# Patient Record
Sex: FEMALE | Race: WHITE | NOT HISPANIC OR LATINO | URBAN - METROPOLITAN AREA
[De-identification: names, ages, dates, MRNs, and addresses within clinical notes are randomized per-mention and may not be internally consistent; named-entity substitution may affect disease eponyms.]

---

## 2018-04-04 ENCOUNTER — APPOINTMENT (OUTPATIENT)
Dept: WOMENS IMAGING | Facility: HOSPITAL | Age: 53
End: 2018-04-04

## 2018-04-04 PROCEDURE — 77067 SCR MAMMO BI INCL CAD: CPT | Performed by: RADIOLOGY

## 2019-07-05 ENCOUNTER — APPOINTMENT (OUTPATIENT)
Dept: WOMENS IMAGING | Facility: HOSPITAL | Age: 54
End: 2019-07-05

## 2019-07-05 PROCEDURE — 77067 SCR MAMMO BI INCL CAD: CPT | Performed by: RADIOLOGY

## 2019-07-05 PROCEDURE — 77063 BREAST TOMOSYNTHESIS BI: CPT | Performed by: RADIOLOGY

## 2020-07-14 ENCOUNTER — APPOINTMENT (OUTPATIENT)
Dept: WOMENS IMAGING | Facility: HOSPITAL | Age: 55
End: 2020-07-14

## 2020-07-14 PROCEDURE — 77067 SCR MAMMO BI INCL CAD: CPT | Performed by: RADIOLOGY

## 2020-07-14 PROCEDURE — 77063 BREAST TOMOSYNTHESIS BI: CPT | Performed by: RADIOLOGY

## 2021-10-04 ENCOUNTER — APPOINTMENT (OUTPATIENT)
Dept: WOMENS IMAGING | Facility: HOSPITAL | Age: 56
End: 2021-10-04

## 2021-10-04 PROCEDURE — 77067 SCR MAMMO BI INCL CAD: CPT | Performed by: RADIOLOGY

## 2021-10-04 PROCEDURE — 77063 BREAST TOMOSYNTHESIS BI: CPT | Performed by: RADIOLOGY

## 2022-10-05 ENCOUNTER — APPOINTMENT (OUTPATIENT)
Dept: WOMENS IMAGING | Facility: HOSPITAL | Age: 57
End: 2022-10-05

## 2022-10-05 PROCEDURE — 77063 BREAST TOMOSYNTHESIS BI: CPT | Performed by: RADIOLOGY

## 2022-10-05 PROCEDURE — 77067 SCR MAMMO BI INCL CAD: CPT | Performed by: RADIOLOGY

## 2023-10-12 ENCOUNTER — OFFICE VISIT (OUTPATIENT)
Dept: INTERNAL MEDICINE | Facility: CLINIC | Age: 58
End: 2023-10-12
Payer: COMMERCIAL

## 2023-10-12 VITALS
HEIGHT: 66 IN | HEART RATE: 113 BPM | TEMPERATURE: 98.2 F | WEIGHT: 245.6 LBS | DIASTOLIC BLOOD PRESSURE: 83 MMHG | SYSTOLIC BLOOD PRESSURE: 121 MMHG | BODY MASS INDEX: 39.47 KG/M2 | OXYGEN SATURATION: 95 %

## 2023-10-12 DIAGNOSIS — F33.2 SEVERE EPISODE OF RECURRENT MAJOR DEPRESSIVE DISORDER, WITHOUT PSYCHOTIC FEATURES: ICD-10-CM

## 2023-10-12 DIAGNOSIS — F41.9 ANXIETY: ICD-10-CM

## 2023-10-12 DIAGNOSIS — M79.605 LEG PAIN, BILATERAL: ICD-10-CM

## 2023-10-12 DIAGNOSIS — M79.604 LEG PAIN, BILATERAL: ICD-10-CM

## 2023-10-12 DIAGNOSIS — B37.2 YEAST INFECTION OF THE SKIN: ICD-10-CM

## 2023-10-12 DIAGNOSIS — Z76.89 ENCOUNTER TO ESTABLISH CARE: Primary | ICD-10-CM

## 2023-10-12 RX ORDER — NYSTATIN AND TRIAMCINOLONE ACETONIDE 100000; 1 [USP'U]/G; MG/G
OINTMENT TOPICAL
COMMUNITY
Start: 2023-10-09

## 2023-10-12 RX ORDER — FLUCONAZOLE 150 MG/1
TABLET ORAL
COMMUNITY
Start: 2023-10-09

## 2023-10-12 RX ORDER — BUPROPION HYDROCHLORIDE 150 MG/1
150 TABLET ORAL DAILY
Qty: 30 TABLET | Refills: 0 | Status: SHIPPED | OUTPATIENT
Start: 2023-10-12

## 2023-10-12 NOTE — PATIENT INSTRUCTIONS
Depression Screening  Depression screening is a tool that your health care provider can use to learn if you have symptoms of depression. Depression is a common condition with many symptoms that are also often found in other conditions. Depression is treatable, but it must first be diagnosed. You may not know that certain feelings, thoughts, and behaviors that you are having can be symptoms of depression.  Taking a depression screening test can help you and your health care provider decide if you need more assessment, or if you should be referred to a mental health care provider.  What are the screening tests?  You may have a physical exam to see if another condition is affecting your mental health. You may have a blood or urine sample taken during the physical exam.  You may be interviewed or offered a written test using a screening tool that was developed from research, such as one of these:  Patient Health Questionnaire (PHQ). This is a set of either 2 or 9 questions. A health care provider who has been trained to score this screening test uses a guide to assess if your symptoms suggest that you may have depression.  Lara Depression Rating Scale (HAM-D). This is a set of either 17 or 24 questions. You may be asked to take it again during or after your treatment, to see if your depression has gotten better.  Gates Depression Inventory (BDI). This is a set of 21 multiple choice questions. Your health care provider scores your answers to assess:  Your level of depression, ranging from mild to severe.  Your response to treatment.  Your health care provider may talk with you about your daily activities, such as eating, sleeping, work, and recreation, and ask if you have had any changes in activity.  Your health care provider may ask you to see a mental health specialist, such as a psychiatrist or psychologist, for more evaluation.  Who should be screened for depression?    All adults, including adults with a family  history of a mental health disorder.  People who are 10-21 years old.  People who are recovering from an acute condition, such as myocardial infarction (MI) or stroke.  Pregnant women, or women who have given birth.  People who have a long-term (chronic) illness.  Anyone who has been diagnosed with another type of mental health disorder.  Anyone who has symptoms that could show depression.  What do my results mean?  Your health care provider will review the results of your depression screening, physical exam, and lab tests. Positive screens suggest that you may have depression. Screening is the first step in getting the care that you may need. It will be important for you to know the results of your tests.  Ask your health care provider, or the department that is doing your screening tests, when your results will be ready.  Talk with your health care provider about your results, diagnosis, and recommendations for follow-up.  A diagnosis of depression is made using information from the Diagnostic and Statistical Manual of Mental Disorders (DSM-5). This is a book that lists the number and type of symptoms that must be present for a health care provider to give a specific diagnosis.  Your health care provider may work with you to treat your symptoms of depression, or your health care provider may help you find a mental health provider who can assess and help you develop a plan to treat your depression.  Get help right away if:  You have thoughts about hurting yourself or others.  If you ever feel like you may hurt yourself or others, or have thoughts about taking your own life, get help right away. Go to your nearest emergency department or:  Call your local emergency services (815 in the [place]  Call a suicide crisis helpline, such as the National Suicide Prevention Lifeline at 1-585.989.4867 or 410 in the [place] This is open 24 hours a day in the [place]  Text the Crisis Text Line at 944909 (in the  [place]  Summary  Depression screening is the first step in getting the help that you may need.  If your screening test shows symptoms of depression (is positive), your health care provider may ask you to see a mental health provider who will help identify ways to treat your depression.  Anyone aged 10 or older should be screened for depression.  This information is not intended to replace advice given to you by your health care provider. Make sure you discuss any questions you have with your health care provider.  Document Revised: 07/13/2022 Document Reviewed: 03/28/2022  [employer] Patient Education c 2023 [employer]    Generalized Anxiety Disorder, Adult  Generalized anxiety disorder (FLACA) is a mental health condition. Unlike normal worries, anxiety related to FLACA is not triggered by a specific event. These worries do not fade or get better with time. FLACA interferes with relationships, work, and school.  FLACA symptoms can vary from mild to severe. People with severe FLACA can have intense waves of anxiety with physical symptoms that are similar to panic attacks.  What are the causes?  The exact cause of FLACA is not known, but the following are believed to have an impact:  Differences in natural brain chemicals.  Genes passed down from parents to children.  Differences in the way threats are perceived.  Development and stress during childhood.  Personality.  What increases the risk?  The following factors may make you more likely to develop this condition:  Being female.  Having a family history of anxiety disorders.  Being very shy.  Experiencing very stressful life events, such as the death of a loved one.  Having a very stressful family environment.  What are the signs or symptoms?  People with FLACA often worry excessively about many things in their lives, such as their health and family. Symptoms may also include:  Mental and emotional symptoms:  Worrying excessively about natural disasters.  Fear of being  late.  Difficulty concentrating.  Fears that others are judging your performance.  Physical symptoms:  Fatigue.  Headaches, muscle tension, muscle twitches, trembling, or feeling shaky.  Feeling like your heart is pounding or beating very fast.  Feeling out of breath or like you cannot take a deep breath.  Having trouble falling asleep or staying asleep, or experiencing restlessness.  Sweating.  Nausea, diarrhea, or irritable bowel syndrome (IBS).  Behavioral symptoms:  Experiencing erratic moods or irritability.  Avoidance of new situations.  Avoidance of people.  Extreme difficulty making decisions.  How is this diagnosed?  This condition is diagnosed based on your symptoms and medical history. You will also have a physical exam. Your health care provider may perform tests to rule out other possible causes of your symptoms.  To be diagnosed with FLACA, a person must have anxiety that:  Is out of his or her control.  Affects several different aspects of his or her life, such as work and relationships.  Causes distress that makes him or her unable to take part in normal activities.  Includes at least three symptoms of FLACA, such as restlessness, fatigue, trouble concentrating, irritability, muscle tension, or sleep problems.  Before your health care provider can confirm a diagnosis of FLACA, these symptoms must be present more days than they are not, and they must last for 6 months or longer.  How is this treated?  This condition may be treated with:  Medicine. Antidepressant medicine is usually prescribed for long-term daily control. Anti-anxiety medicines may be added in severe cases, especially when panic attacks occur.  Talk therapy (psychotherapy). Certain types of talk therapy can be helpful in treating FLACA by providing support, education, and guidance. Options include:  Cognitive behavioral therapy (CBT). People learn coping skills and self-calming techniques to ease their physical symptoms. They learn to identify  unrealistic thoughts and behaviors and to replace them with more appropriate thoughts and behaviors.  Acceptance and commitment therapy (ACT). This treatment teaches people how to be mindful as a way to cope with unwanted thoughts and feelings.  Biofeedback. This process trains you to manage your body's response (physiological response) through breathing techniques and relaxation methods. You will work with a therapist while machines are used to monitor your physical symptoms.  Stress management techniques. These include yoga, meditation, and exercise.  A mental health specialist can help determine which treatment is best for you. Some people see improvement with one type of therapy. However, other people require a combination of therapies.  Follow these instructions at home:  Lifestyle  Maintain a consistent routine and schedule.  Anticipate stressful situations. Create a plan and allow extra time to work with your plan.  Practice stress management or self-calming techniques that you have learned from your therapist or your health care provider.  Exercise regularly and spend time outdoors.  Eat a healthy diet that includes plenty of vegetables, fruits, whole grains, low-fat dairy products, and lean protein.  Do not eat a lot of foods that are high in fat, added sugar, or salt (sodium).  Drink plenty of water.  Avoid alcohol. Alcohol can increase anxiety.  Avoid caffeine and certain over-the-counter cold medicines. These may make you feel worse. Ask your pharmacist which medicines to avoid.  General instructions  Take over-the-counter and prescription medicines only as told by your health care provider.  Understand that you are likely to have setbacks. Accept this and be kind to yourself as you persist to take better care of yourself.  Anticipate stressful situations. Create a plan and allow extra time to work with your plan.  Recognize and accept your accomplishments, even if you  them as small.  Spend time  with people who care about you.  Keep all follow-up visits. This is important.  Where to find more information  National [place] of Mental Health: www.nimh.nih.gov  Substance Abuse and Mental Health Services: www.samhsa.gov  Contact a health care provider if:  Your symptoms do not get better.  Your symptoms get worse.  You have signs of depression, such as:  A persistently sad or irritable mood.  Loss of enjoyment in activities that used to bring you sumi.  Change in weight or eating.  Changes in sleeping habits.  Get help right away if:  You have thoughts about hurting yourself or others.  If you ever feel like you may hurt yourself or others, or have thoughts about taking your own life, get help right away. Go to your nearest emergency department or:  Call your local emergency services (864 in the [place]  Call a suicide crisis helpline, such as the National Suicide Prevention Lifeline at 1-600.602.5129 or 816 in the [place] This is open 24 hours a day in the [place]  Text the Crisis Text Line at 590826 (in the [place]  Summary  Generalized anxiety disorder (FLACA) is a mental health condition that involves worry that is not triggered by a specific event.  People with FLACA often worry excessively about many things in their lives, such as their health and family.  FLACA may cause symptoms such as restlessness, trouble concentrating, sleep problems, frequent sweating, nausea, diarrhea, headaches, and trembling or muscle twitching.  A mental health specialist can help determine which treatment is best for you. Some people see improvement with one type of therapy. However, other people require a combination of therapies.  This information is not intended to replace advice given to you by your health care provider. Make sure you discuss any questions you have with your health care provider.  Document Revised: 07/13/2022 Document Reviewed: 04/10/2022  [employer] Patient Education c 2023 [employer]

## 2023-10-12 NOTE — PROGRESS NOTES
Chief Complaint  Establish Care, Rash, Leg Pain (Thigh ), Depression, and Anxiety     Subjective:      History of Present Illness {CC  Problem List  Visit  Diagnosis   Encounters  Notes  Medications  Labs  Result Review Imaging  Media :23}     Carola Joiner presents to [facility] for:      History of Present Illness   Pt is new to me today and is here to establish care with a new PCP.  Pt has not had a PCP in years.     Pt states she did have a rash under her breast, thighs and under skin fold under her stomach. Pt states she had been treating with lotrimin and monistat. Pt saw her Dermatologist and was prescribed diflucan and nystatin.     Leg pain- pt states the tops of both of her thighs have been sore for the last few months. Pt states this happens daily and usually is at the end of the day. Pt sates that she occasionally takes aleve for this which helps.    Depression/anxiety- pt has never been diagnosed with depression. Pt has never been on medication for this.       I have reviewed patient's medical history, any new submitted information provided by patient or medical assistant and updated medical record.      Objective:      Physical Exam  Vitals reviewed.   Constitutional:       Appearance: Normal appearance.   HENT:      Head: Normocephalic.   Cardiovascular:      Rate and Rhythm: Normal rate and regular rhythm.      Pulses: Normal pulses.      Heart sounds: Normal heart sounds.   Pulmonary:      Effort: Pulmonary effort is normal.      Breath sounds: Normal breath sounds.   Skin:     General: Skin is warm and dry.      Capillary Refill: Capillary refill takes less than 2 seconds.   Neurological:      General: No focal deficit present.      Mental Status: She is alert.   Psychiatric:         Mood and Affect: Mood normal. Mood is anxious. Affect is tearful.         Behavior: Behavior normal.        Result Review  Data Reviewed:{ Labs  Result Review  Imaging   "Med Tab  Media :23}                Vital Signs:   /83 (BP Location: Left arm, Patient Position: Sitting, Cuff Size: Adult)   Pulse 113   Temp 98.2 øF (36.8 øC) (Oral)   Ht 167.6 cm (66\")   Wt 111 kg (245 lb 9.6 oz)   SpO2 95%   BMI 39.64 kg/mý         Requested Prescriptions     Signed Prescriptions Disp Refills    buPROPion XL (Wellbutrin XL) 150 MG 24 hr tablet 30 tablet 0     Sig: Take 1 tablet by mouth Daily.       Routine medications provided by this office will also be refilled via pharmacy request.       Current Outpatient Medications:     fluconazole (DIFLUCAN) 150 MG tablet, , Disp: , Rfl:     nystatin-triamcinolone (MYCOLOG) 286374-8.1 UNIT/GM-% ointment, , Disp: , Rfl:     buPROPion XL (Wellbutrin XL) 150 MG 24 hr tablet, Take 1 tablet by mouth Daily., Disp: 30 tablet, Rfl: 0     Assessment and Plan:      Assessment and Plan {CC Problem List  Visit Diagnosis  ROS  Review (Popup)  Health Maintenance  Quality  BestPractice  Medications  SmartSets  SnapShot Encounters  Media :23}     Problem List Items Addressed This Visit    None  Visit Diagnoses       Encounter to establish care    -  Primary    Severe episode of recurrent major depressive disorder, without psychotic features        Relevant Medications    buPROPion XL (Wellbutrin XL) 150 MG 24 hr tablet    Anxiety        Relevant Medications    buPROPion XL (Wellbutrin XL) 150 MG 24 hr tablet    Yeast infection of the skin        Relevant Medications    fluconazole (DIFLUCAN) 150 MG tablet    nystatin-triamcinolone (MYCOLOG) 370915-5.1 UNIT/GM-% ointment    Leg pain, bilateral              Educated patient on dosing and side effects of Wellbutrin.  If this does not work for patient will consider using Zoloft or additional SSRI possibly for chronic pain.  Educated patient to continue taking the nystatin and keeping skin folds dry and clean.  Educated patient to stretch, use ice or heat, rest.  We can consider meloxicam in the " future if this pain continues.  Patient verbalized understanding and is comfortable with plan of care    Follow Up {Instructions Charge Capture  Follow-up Communications :23}     Return in about 1 month (around 11/12/2023) for Annual physical.    I spent 30 minutes caring for Carola on this date of service. This time includes time spent by me in the following activities: preparing for the visit, reviewing tests, obtaining and/or reviewing a separately obtained history, performing a medically appropriate examination and/or evaluation, counseling and educating the patient/family/caregiver, ordering medications, tests, or procedures, documenting information in the medical record, independently interpreting results and communicating that information with the patient/family/caregiver, and care coordination     Patient was given instructions and counseling regarding her condition or for health maintenance advice. Please see specific information pulled into the AVS if appropriate.    Dragon disclaimer:   Much of this encounter note is an electronic transcription/translation of spoken language to printed text. The electronic translation of spoken language may permit erroneous, or at times, nonsensical words or phrases to be inadvertently transcribed; Although I have reviewed the note for such errors, some may still exist.     Additional Patient Counseling:       Patient Instructions   Depression Screening  Depression screening is a tool that your health care provider can use to learn if you have symptoms of depression. Depression is a common condition with many symptoms that are also often found in other conditions. Depression is treatable, but it must first be diagnosed. You may not know that certain feelings, thoughts, and behaviors that you are having can be symptoms of depression.  Taking a depression screening test can help you and your health care provider decide if you need more assessment, or if you should be  referred to a mental health care provider.  What are the screening tests?  You may have a physical exam to see if another condition is affecting your mental health. You may have a blood or urine sample taken during the physical exam.  You may be interviewed or offered a written test using a screening tool that was developed from research, such as one of these:  Patient Health Questionnaire (PHQ). This is a set of either 2 or 9 questions. A health care provider who has been trained to score this screening test uses a guide to assess if your symptoms suggest that you may have depression.  Lara Depression Rating Scale (HAM-D). This is a set of either 17 or 24 questions. You may be asked to take it again during or after your treatment, to see if your depression has gotten better.  Gates Depression Inventory (BDI). This is a set of 21 multiple choice questions. Your health care provider scores your answers to assess:  Your level of depression, ranging from mild to severe.  Your response to treatment.  Your health care provider may talk with you about your daily activities, such as eating, sleeping, work, and recreation, and ask if you have had any changes in activity.  Your health care provider may ask you to see a mental health specialist, such as a psychiatrist or psychologist, for more evaluation.  Who should be screened for depression?    All adults, including adults with a family history of a mental health disorder.  People who are 10-21 years old.  People who are recovering from an acute condition, such as myocardial infarction (MI) or stroke.  Pregnant women, or women who have given birth.  People who have a long-term (chronic) illness.  Anyone who has been diagnosed with another type of mental health disorder.  Anyone who has symptoms that could show depression.  What do my results mean?  Your health care provider will review the results of your depression screening, physical exam, and lab tests. Positive  screens suggest that you may have depression. Screening is the first step in getting the care that you may need. It will be important for you to know the results of your tests.  Ask your health care provider, or the department that is doing your screening tests, when your results will be ready.  Talk with your health care provider about your results, diagnosis, and recommendations for follow-up.  A diagnosis of depression is made using information from the Diagnostic and Statistical Manual of Mental Disorders (DSM-5). This is a book that lists the number and type of symptoms that must be present for a health care provider to give a specific diagnosis.  Your health care provider may work with you to treat your symptoms of depression, or your health care provider may help you find a mental health provider who can assess and help you develop a plan to treat your depression.  Get help right away if:  You have thoughts about hurting yourself or others.  If you ever feel like you may hurt yourself or others, or have thoughts about taking your own life, get help right away. Go to your nearest emergency department or:  Call your local emergency services (264 in the [place]  Call a suicide crisis helpline, such as the National Suicide Prevention Lifeline at 1-831.697.6151 or 027 in the [place] This is open 24 hours a day in the [place]  Text the Crisis Text Line at 793482 (in the [place]  Summary  Depression screening is the first step in getting the help that you may need.  If your screening test shows symptoms of depression (is positive), your health care provider may ask you to see a mental health provider who will help identify ways to treat your depression.  Anyone aged 10 or older should be screened for depression.  This information is not intended to replace advice given to you by your health care provider. Make sure you discuss any questions you have with your health care provider.  Document Revised: 07/13/2022 Document  Reviewed: 03/28/2022  [employer] Patient Education c 2023 [employer]    Generalized Anxiety Disorder, Adult  Generalized anxiety disorder (FLACA) is a mental health condition. Unlike normal worries, anxiety related to FLACA is not triggered by a specific event. These worries do not fade or get better with time. FLACA interferes with relationships, work, and school.  FLACA symptoms can vary from mild to severe. People with severe FLACA can have intense waves of anxiety with physical symptoms that are similar to panic attacks.  What are the causes?  The exact cause of FLACA is not known, but the following are believed to have an impact:  Differences in natural brain chemicals.  Genes passed down from parents to children.  Differences in the way threats are perceived.  Development and stress during childhood.  Personality.  What increases the risk?  The following factors may make you more likely to develop this condition:  Being female.  Having a family history of anxiety disorders.  Being very shy.  Experiencing very stressful life events, such as the death of a loved one.  Having a very stressful family environment.  What are the signs or symptoms?  People with FLACA often worry excessively about many things in their lives, such as their health and family. Symptoms may also include:  Mental and emotional symptoms:  Worrying excessively about natural disasters.  Fear of being late.  Difficulty concentrating.  Fears that others are judging your performance.  Physical symptoms:  Fatigue.  Headaches, muscle tension, muscle twitches, trembling, or feeling shaky.  Feeling like your heart is pounding or beating very fast.  Feeling out of breath or like you cannot take a deep breath.  Having trouble falling asleep or staying asleep, or experiencing restlessness.  Sweating.  Nausea, diarrhea, or irritable bowel syndrome (IBS).  Behavioral symptoms:  Experiencing erratic moods or irritability.  Avoidance of new situations.  Avoidance of  people.  Extreme difficulty making decisions.  How is this diagnosed?  This condition is diagnosed based on your symptoms and medical history. You will also have a physical exam. Your health care provider may perform tests to rule out other possible causes of your symptoms.  To be diagnosed with FLACA, a person must have anxiety that:  Is out of his or her control.  Affects several different aspects of his or her life, such as work and relationships.  Causes distress that makes him or her unable to take part in normal activities.  Includes at least three symptoms of FLACA, such as restlessness, fatigue, trouble concentrating, irritability, muscle tension, or sleep problems.  Before your health care provider can confirm a diagnosis of FLACA, these symptoms must be present more days than they are not, and they must last for 6 months or longer.  How is this treated?  This condition may be treated with:  Medicine. Antidepressant medicine is usually prescribed for long-term daily control. Anti-anxiety medicines may be added in severe cases, especially when panic attacks occur.  Talk therapy (psychotherapy). Certain types of talk therapy can be helpful in treating FLACA by providing support, education, and guidance. Options include:  Cognitive behavioral therapy (CBT). People learn coping skills and self-calming techniques to ease their physical symptoms. They learn to identify unrealistic thoughts and behaviors and to replace them with more appropriate thoughts and behaviors.  Acceptance and commitment therapy (ACT). This treatment teaches people how to be mindful as a way to cope with unwanted thoughts and feelings.  Biofeedback. This process trains you to manage your body's response (physiological response) through breathing techniques and relaxation methods. You will work with a therapist while machines are used to monitor your physical symptoms.  Stress management techniques. These include yoga, meditation, and exercise.  A  mental health specialist can help determine which treatment is best for you. Some people see improvement with one type of therapy. However, other people require a combination of therapies.  Follow these instructions at home:  Lifestyle  Maintain a consistent routine and schedule.  Anticipate stressful situations. Create a plan and allow extra time to work with your plan.  Practice stress management or self-calming techniques that you have learned from your therapist or your health care provider.  Exercise regularly and spend time outdoors.  Eat a healthy diet that includes plenty of vegetables, fruits, whole grains, low-fat dairy products, and lean protein.  Do not eat a lot of foods that are high in fat, added sugar, or salt (sodium).  Drink plenty of water.  Avoid alcohol. Alcohol can increase anxiety.  Avoid caffeine and certain over-the-counter cold medicines. These may make you feel worse. Ask your pharmacist which medicines to avoid.  General instructions  Take over-the-counter and prescription medicines only as told by your health care provider.  Understand that you are likely to have setbacks. Accept this and be kind to yourself as you persist to take better care of yourself.  Anticipate stressful situations. Create a plan and allow extra time to work with your plan.  Recognize and accept your accomplishments, even if you  them as small.  Spend time with people who care about you.  Keep all follow-up visits. This is important.  Where to find more information  National Worth of Mental Health: www.nimh.nih.gov  Substance Abuse and Mental Health Services: www.samhsa.gov  Contact a health care provider if:  Your symptoms do not get better.  Your symptoms get worse.  You have signs of depression, such as:  A persistently sad or irritable mood.  Loss of enjoyment in activities that used to bring you sumi.  Change in weight or eating.  Changes in sleeping habits.  Get help right away if:  You have thoughts  about hurting yourself or others.  If you ever feel like you may hurt yourself or others, or have thoughts about taking your own life, get help right away. Go to your nearest emergency department or:  Call your local emergency services (987 in the [place]  Call a suicide crisis helpline, such as the National Suicide Prevention Lifeline at 1-758.182.3094 or 453 in the [place] This is open 24 hours a day in the [place]  Text the Crisis Text Line at 268032 (in the [place]  Summary  Generalized anxiety disorder (FLACA) is a mental health condition that involves worry that is not triggered by a specific event.  People with FLACA often worry excessively about many things in their lives, such as their health and family.  FLACA may cause symptoms such as restlessness, trouble concentrating, sleep problems, frequent sweating, nausea, diarrhea, headaches, and trembling or muscle twitching.  A mental health specialist can help determine which treatment is best for you. Some people see improvement with one type of therapy. However, other people require a combination of therapies.  This information is not intended to replace advice given to you by your health care provider. Make sure you discuss any questions you have with your health care provider.  Document Revised: 07/13/2022 Document Reviewed: 04/10/2022  [employer] Patient Education c 2023 [employer]

## 2023-11-09 ENCOUNTER — TELEPHONE (OUTPATIENT)
Dept: INTERNAL MEDICINE | Facility: CLINIC | Age: 58
End: 2023-11-09

## 2023-11-09 DIAGNOSIS — Z00.00 ANNUAL PHYSICAL EXAM: Primary | ICD-10-CM

## 2023-11-09 DIAGNOSIS — Z13.0 SCREENING FOR DEFICIENCY ANEMIA: ICD-10-CM

## 2023-11-09 DIAGNOSIS — Z13.1 SCREENING FOR DIABETES MELLITUS: ICD-10-CM

## 2023-11-09 DIAGNOSIS — Z00.00 ANNUAL PHYSICAL EXAM: ICD-10-CM

## 2023-11-09 DIAGNOSIS — Z13.220 SCREENING FOR CHOLESTEROL LEVEL: ICD-10-CM

## 2023-11-09 DIAGNOSIS — Z13.29 SCREENING FOR THYROID DISORDER: ICD-10-CM

## 2023-11-09 DIAGNOSIS — Z13.228 SCREENING FOR METABOLIC DISORDER: ICD-10-CM

## 2023-11-09 NOTE — TELEPHONE ENCOUNTER
Caller: Carola Joiner    Relationship: Self    Best call back number: [phone]    What orders are you requesting (i.e. lab or imaging): LABS    In what timeframe would the patient need to come in: ASAP    Where will you receive your lab/imaging services: OFFICE    Additional notes: PATIENT HAS A PHYSICAL ON 11/13/23 AT 2:30 AND WOULD NOT BE ABLE TO FAST THAT LONG AND IS REQUESTING IF LAB ORDERS BE PUT IN SO SHE CAN DO THEM EARLIER. PATIENT REQUESTING A CALLBACK ONCE LAB ORDERS ARE IN TO GET SCHEDULED.

## 2023-11-10 LAB
ALBUMIN SERPL-MCNC: 4.3 G/DL (ref 3.5–5.2)
ALBUMIN/GLOB SERPL: 2 G/DL
ALP SERPL-CCNC: 101 U/L (ref 39–117)
ALT SERPL-CCNC: 16 U/L (ref 1–33)
AST SERPL-CCNC: 18 U/L (ref 1–32)
BASOPHILS # BLD AUTO: 0.04 10*3/MM3 (ref 0–0.2)
BASOPHILS NFR BLD AUTO: 0.4 % (ref 0–1.5)
BILIRUB SERPL-MCNC: 0.3 MG/DL (ref 0–1.2)
BUN SERPL-MCNC: 12 MG/DL (ref 6–20)
BUN/CREAT SERPL: 17.1 (ref 7–25)
CALCIUM SERPL-MCNC: 9.4 MG/DL (ref 8.6–10.5)
CHLORIDE SERPL-SCNC: 106 MMOL/L (ref 98–107)
CHOLEST SERPL-MCNC: 181 MG/DL (ref 0–200)
CO2 SERPL-SCNC: 28.7 MMOL/L (ref 22–29)
CREAT SERPL-MCNC: 0.7 MG/DL (ref 0.57–1)
EGFRCR SERPLBLD CKD-EPI 2021: 101 ML/MIN/1.73
EOSINOPHIL # BLD AUTO: 0.15 10*3/MM3 (ref 0–0.4)
EOSINOPHIL NFR BLD AUTO: 1.6 % (ref 0.3–6.2)
ERYTHROCYTE [DISTWIDTH] IN BLOOD BY AUTOMATED COUNT: 12.7 % (ref 12.3–15.4)
GLOBULIN SER CALC-MCNC: 2.1 GM/DL
GLUCOSE SERPL-MCNC: 92 MG/DL (ref 65–99)
HBA1C MFR BLD: 5.8 % (ref 4.8–5.6)
HCT VFR BLD AUTO: 43.6 % (ref 34–46.6)
HDLC SERPL-MCNC: 53 MG/DL (ref 40–60)
HGB BLD-MCNC: 14.8 G/DL (ref 12–15.9)
IMM GRANULOCYTES # BLD AUTO: 0.03 10*3/MM3 (ref 0–0.05)
IMM GRANULOCYTES NFR BLD AUTO: 0.3 % (ref 0–0.5)
LDLC SERPL CALC-MCNC: 107 MG/DL (ref 0–100)
LYMPHOCYTES # BLD AUTO: 2.43 10*3/MM3 (ref 0.7–3.1)
LYMPHOCYTES NFR BLD AUTO: 26.6 % (ref 19.6–45.3)
MCH RBC QN AUTO: 30.1 PG (ref 26.6–33)
MCHC RBC AUTO-ENTMCNC: 33.9 G/DL (ref 31.5–35.7)
MCV RBC AUTO: 88.8 FL (ref 79–97)
MONOCYTES # BLD AUTO: 0.69 10*3/MM3 (ref 0.1–0.9)
MONOCYTES NFR BLD AUTO: 7.5 % (ref 5–12)
NEUTROPHILS # BLD AUTO: 5.81 10*3/MM3 (ref 1.7–7)
NEUTROPHILS NFR BLD AUTO: 63.6 % (ref 42.7–76)
NRBC BLD AUTO-RTO: 0 /100 WBC (ref 0–0.2)
PLATELET # BLD AUTO: 353 10*3/MM3 (ref 140–450)
POTASSIUM SERPL-SCNC: 4.8 MMOL/L (ref 3.5–5.2)
PROT SERPL-MCNC: 6.4 G/DL (ref 6–8.5)
RBC # BLD AUTO: 4.91 10*6/MM3 (ref 3.77–5.28)
SODIUM SERPL-SCNC: 143 MMOL/L (ref 136–145)
TRIGL SERPL-MCNC: 116 MG/DL (ref 0–150)
TSH SERPL DL<=0.005 MIU/L-ACNC: 0.58 UIU/ML (ref 0.27–4.2)
VLDLC SERPL CALC-MCNC: 21 MG/DL (ref 5–40)
WBC # BLD AUTO: 9.15 10*3/MM3 (ref 3.4–10.8)

## 2023-11-13 ENCOUNTER — OFFICE VISIT (OUTPATIENT)
Dept: INTERNAL MEDICINE | Facility: CLINIC | Age: 58
End: 2023-11-13
Payer: COMMERCIAL

## 2023-11-13 VITALS
DIASTOLIC BLOOD PRESSURE: 94 MMHG | BODY MASS INDEX: 38.99 KG/M2 | OXYGEN SATURATION: 97 % | WEIGHT: 242.6 LBS | TEMPERATURE: 97.8 F | HEIGHT: 66 IN | SYSTOLIC BLOOD PRESSURE: 135 MMHG | HEART RATE: 104 BPM

## 2023-11-13 DIAGNOSIS — Z12.11 SCREEN FOR COLON CANCER: ICD-10-CM

## 2023-11-13 DIAGNOSIS — F33.2 SEVERE EPISODE OF RECURRENT MAJOR DEPRESSIVE DISORDER, WITHOUT PSYCHOTIC FEATURES: Chronic | ICD-10-CM

## 2023-11-13 DIAGNOSIS — Z00.00 ROUTINE GENERAL MEDICAL EXAMINATION AT A HEALTH CARE FACILITY: ICD-10-CM

## 2023-11-13 DIAGNOSIS — Z71.6 TOBACCO ABUSE COUNSELING: ICD-10-CM

## 2023-11-13 DIAGNOSIS — H53.9 CHANGES IN VISION: ICD-10-CM

## 2023-11-13 DIAGNOSIS — Z91.89 HX OF MODERATE SUN EXPOSURE: ICD-10-CM

## 2023-11-13 DIAGNOSIS — Z23 NEED FOR TDAP VACCINATION: ICD-10-CM

## 2023-11-13 DIAGNOSIS — Z23 NEED FOR PROPHYLACTIC VACCINATION AGAINST STREPTOCOCCUS PNEUMONIAE (PNEUMOCOCCUS) AND INFLUENZA: ICD-10-CM

## 2023-11-13 DIAGNOSIS — Z87.891 PERSONAL HISTORY OF TOBACCO USE, PRESENTING HAZARDS TO HEALTH: ICD-10-CM

## 2023-11-13 DIAGNOSIS — F41.9 ANXIETY: Primary | Chronic | ICD-10-CM

## 2023-11-13 DIAGNOSIS — Z12.11 ENCOUNTER FOR SCREENING FOR MALIGNANT NEOPLASM OF COLON: ICD-10-CM

## 2023-11-13 DIAGNOSIS — Z23 NEED FOR STREPTOCOCCUS PNEUMONIAE VACCINATION: ICD-10-CM

## 2023-11-13 DIAGNOSIS — Z12.2 ENCOUNTER FOR SCREENING FOR LUNG CANCER: ICD-10-CM

## 2023-11-13 RX ORDER — BUPROPION HYDROCHLORIDE 300 MG/1
300 TABLET ORAL DAILY
Qty: 90 TABLET | Refills: 3 | Status: SHIPPED | OUTPATIENT
Start: 2023-11-13

## 2023-11-13 NOTE — PROGRESS NOTES
Chief Complaint  Annual Exam, Anxiety, Depression, Follow-up, and Skin Problem     Subjective:      History of Present Illness {CC  Problem List  Visit  Diagnosis   Encounters  Notes  Medications  Labs  Result Review Imaging  Media :23}     Carola Joiner presents to [facility] for:      History of Present Illness     Anxiety/ Depression- pt was started on Wellbutrin  10/12/2023.    - pt states she is doing really well on wellbutrin.  Patient states that she is going through a really tough time with her son right now and although she is doing much better than she would have with that Wellbutrin she does feel like she could potentially go up on the dose.  Patient had to recently cancel seeing her daughter due to her son's mental health concerns.    Patient also states she has a long history of sun exposure and would like to be referred to her dermatologist.    Patient would like to be referred to an ophthalmologist for further management.    Pt states she has had change in her bowel habits. Pt states that     Carola is here for coordination of medical care, to discuss health maintenance, disease prevention as well as to followup on medical problems.     Patient Care Team:  Orly Bautista APRN as PCP - General (Nurse Practitioner)  Alesia Dykes MD as Consulting Physician (Obstetrics and Gynecology)     Activity level is rarely.   Exercises 0 per week.     Weight trend is     Health and Weight:   Weight trend is   Wt Readings from Last 4 Encounters:   11/13/23 110 kg (242 lb 9.6 oz)   10/12/23 111 kg (245 lb 9.6 oz)       Class 2 Severe Obesity (BMI >=35 and <=39.9). Obesity-related health conditions include the following: none. Obesity is newly identified. BMI is is above average; BMI management plan is completed. We discussed low calorie, low carb based diet program, portion control, and increasing exercise.      Health Maintenance Female:    GYN: Dr. Dykes    Last gynecology appointment: 10/2023  Patient's last mammogram was 10/2023  Advised routine self-breast exams monthly.  Sexually active: yes  Pap smears have been: yes    Colon cancer screen:     She has change in bowel habits.   Patient's last colonoscopy was 2020- cologuard .   She was advised to repeat in this year.    Vaccines: UTD     Last eye exam: will schedule     Advised regular sunscreen.        Her cardiovascular risks are:     [] No Known risk factors    [] Hypertension   [] Hyperlipidemia  [] Diabetes    [] Obesity  [x] Family history   [] Current or hx tobacco use  [] Sedentary lifestyle   [] Post-menopausal    I have reviewed patient's medical history, any new submitted information provided by patient or medical assistant and updated medical record.      Objective:      Physical Exam  Vitals reviewed.   Constitutional:       General: She is not in acute distress.     Appearance: Normal appearance. She is not ill-appearing, toxic-appearing or diaphoretic.   HENT:      Head: Normocephalic.      Nose: Nose normal.      Mouth/Throat:      Mouth: Mucous membranes are moist.   Eyes:      General:         Right eye: No discharge.         Left eye: No discharge.      Extraocular Movements: Extraocular movements intact.      Conjunctiva/sclera: Conjunctivae normal.      Pupils: Pupils are equal, round, and reactive to light.   Neck:      Vascular: No carotid bruit.   Cardiovascular:      Rate and Rhythm: Normal rate and regular rhythm.      Pulses: Normal pulses.      Heart sounds: Normal heart sounds. No murmur heard.     No friction rub. No gallop.   Pulmonary:      Effort: Pulmonary effort is normal. No respiratory distress.      Breath sounds: Normal breath sounds. No stridor. No wheezing, rhonchi or rales.   Chest:      Chest wall: No tenderness.   Abdominal:      General: Abdomen is flat. Bowel sounds are normal. There is no distension.      Palpations: Abdomen is soft. There is no mass.       "Tenderness: There is no abdominal tenderness. There is no guarding or rebound.      Hernia: No hernia is present.   Musculoskeletal:         General: No swelling, tenderness, deformity or signs of injury. Normal range of motion.      Cervical back: Normal range of motion. No rigidity or tenderness.      Right lower leg: No edema.      Left lower leg: No edema.   Lymphadenopathy:      Cervical: No cervical adenopathy.   Skin:     General: Skin is warm and dry.      Capillary Refill: Capillary refill takes less than 2 seconds.      Coloration: Skin is not jaundiced or pale.      Findings: No bruising, erythema, lesion or rash.   Neurological:      General: No focal deficit present.      Mental Status: She is alert and oriented to person, place, and time. Mental status is at baseline.      Motor: No weakness.      Gait: Gait normal.   Psychiatric:         Mood and Affect: Mood normal.         Behavior: Behavior normal.         Thought Content: Thought content normal.         Judgment: Judgment normal.        Result Review  Data Reviewed:{ Labs  Result Review  Imaging  Med Tab  Media :23}                Vital Signs:   /94 (BP Location: Left arm, Patient Position: Sitting, Cuff Size: Adult)   Pulse 104   Temp 97.8 °F (36.6 °C) (Oral)   Ht 167.6 cm (66\")   Wt 110 kg (242 lb 9.6 oz)   SpO2 97%   BMI 39.16 kg/m²         Requested Prescriptions     Signed Prescriptions Disp Refills    buPROPion XL (Wellbutrin XL) 300 MG 24 hr tablet 90 tablet 3     Sig: Take 1 tablet by mouth Daily.       Routine medications provided by this office will also be refilled via pharmacy request.       Current Outpatient Medications:     fluconazole (DIFLUCAN) 150 MG tablet, , Disp: , Rfl:     nystatin-triamcinolone (MYCOLOG) 438600-9.1 UNIT/GM-% ointment, , Disp: , Rfl:     buPROPion XL (Wellbutrin XL) 300 MG 24 hr tablet, Take 1 tablet by mouth Daily., Disp: 90 tablet, Rfl: 3     Assessment and Plan:      Assessment and Plan {CC " Problem List  Visit Diagnosis  ROS  Review (Popup)  [facility] Maintenance  Quality  BestPractice  Medications  SmartSets  SnapShot Encounters  Media :23}     Problem List Items Addressed This Visit       Anxiety - Primary (Chronic)    Relevant Medications    buPROPion XL (Wellbutrin XL) 300 MG 24 hr tablet    Severe episode of recurrent major depressive disorder, without psychotic features (Chronic)    Current Assessment & Plan     Patient's depression is recurrent and is mild without psychosis. Their depression is currently active and the condition is improving with treatment. This will be reassessed at the next regular appointment. F/U as described:patient depression is being managed by their pcp.         Relevant Medications    buPROPion XL (Wellbutrin XL) 300 MG 24 hr tablet     Other Visit Diagnoses       Encounter for screening for malignant neoplasm of colon        Relevant Orders    Ambulatory Referral For Screening Colonoscopy    Encounter for screening for lung cancer        Relevant Orders     CT Chest Low Dose Cancer Screening WO    Need for prophylactic vaccination against Streptococcus pneumoniae (pneumococcus) and influenza        Routine general medical examination at a health care facility        Screen for colon cancer        Relevant Orders    Ambulatory Referral For Screening Colonoscopy    Personal history of tobacco use, presenting hazards to health        Relevant Orders     CT Chest Low Dose Cancer Screening WO    Tobacco abuse counseling        Relevant Orders     CT Chest Low Dose Cancer Screening WO    Hx of moderate sun exposure        Relevant Orders    Ambulatory Referral to Dermatology (Completed)    Changes in vision        Relevant Orders    Ambulatory Referral to Ophthalmology (Completed)    Need for Tdap vaccination        Relevant Orders    Tdap Vaccine => 8yo IM (BOOSTRIX) (Completed)    Need for Streptococcus pneumoniae vaccination        Relevant Orders    Pneumococcal  Conjugate Vaccine 20-Valent (PCV20) (Completed)          Reviewed labs with patient.  Educated patient on new dosing of Wellbutrin.  Will order chest CT and colonoscopy for patient and review results as available.  We will send patient to dermatology and ophthalmology.  Educated patient to maintain healthy diet and daily exercise.  Educated patient on smoking cessation.  Patient verbalized understanding and is comfortable with the plan of care.    Follow Up {Instructions Charge Capture  Follow-up Communications :23}     Return in 6 months (on 5/13/2024) for Recheck.      Patient was given instructions and counseling regarding her condition or for health maintenance advice. Please see specific information pulled into the AVS if appropriate.    Dragon disclaimer:   Much of this encounter note is an electronic transcription/translation of spoken language to printed text. The electronic translation of spoken language may permit erroneous, or at times, nonsensical words or phrases to be inadvertently transcribed; Although I have reviewed the note for such errors, some may still exist.     Additional Patient Counseling:       Patient Instructions   Please review the decision aid used during our discussion regarding the Low dose lung cancer screening visit today.                         Low-Dose Lung Cancer CT Screening Visit    CHIEF COMPLAINT:    Shared Decision Making  I am discussing tobacco cessation today with Carola Joiner    SMOKING HISTORY:     Social History     Tobacco Use   Smoking Status Every Day    Packs/day: 1.50    Years: 25.00    Additional pack years: 0.00    Total pack years: 37.50    Types: Cigarettes   Smokeless Tobacco Never       SUBJECTIVE:     Carola Joiner is currently smoking 1 pack(s) per day with a  365 pack year history.  Reports no use of alternate forms of tobacco, electronic cigarettes, marijuana or other substances.  Based on the recommendation of the [place] Preventive Services Task  "Force, this patient is at high risk for lung cancer and a low-dose CT screening scan is recommended.     The patient has had no hemoptysis, unintentional weight loss or increasing shortness of breath. The patient is asymptomatic and has no signs or symptoms of lung cancer.     Together we discussed the potential benefits and potential harms of being screened for lung cancer including the potential for follow up diagnostic testing, risk for over diagnosis, false positive rate and radiation exposure using the [facility] Lung Cancer Screening Shared Decision-Making Tool. A copy of this decision aid resource has been provided in the after visit summary.  We also reviewed the patient's smoking history and counseled her on the importance and health benefits of stopping the use of tobacco products.      OBJECTIVE:    /94 (BP Location: Left arm, Patient Position: Sitting, Cuff Size: Adult)   Pulse 104   Temp 97.8 °F (36.6 °C) (Oral)   Ht 167.6 cm (66\")   Wt 110 kg (242 lb 9.6 oz)   SpO2 97%   BMI 39.16 kg/m²   General: no distress, alert and oriented  Chest: Lung sounds are clear to auscultation, no wheezes, rales or rhonchi, with symmetric air entry. No respiratory distress  Cardiovascular: RRR with no murmur auscultated      Smoking Cessation discussion:     We discussed that there are a number of resources and interventions to assist with smoking cessation if needed in the future.   On a scale of zero to ten, the patient rates their motivation to quit at a 6 out of 10 today.  Referral to stop smoking class has been offered. Medication options for tobacco cessation have been discussed with the patient.     Recommendations for continued lung cancer screening:      We discussed the NCCN guidelines for lung cancer screening and the patient verbalized understanding that annual screening is recommended until fifteen years beyond smoking as long as they have no other disease or comorbidity that would prevent " them from receiving cancer treatments such as surgery should a lung cancer be detected.  After review of the NCCN guidelines and recommendations for ongoing screening, the patient verbalized understanding of recommendations for follow-up.  The patient has decided to proceed with a Low Dose Lung Cancer Screening CT today.      15 minutes face-to-face spent counseling patient on the continued health benefits of having quit tobacco, maintaining smoking abstinence, smoking cessation strategies and resources, including the 1-800-Quit-Now referenced in the AVS, as well as the importance of adherence to annual lung cancer low-dose CT screening.

## 2023-11-15 PROBLEM — F41.9 ANXIETY: Status: ACTIVE | Noted: 2023-11-15

## 2023-11-15 PROBLEM — F33.2 SEVERE EPISODE OF RECURRENT MAJOR DEPRESSIVE DISORDER, WITHOUT PSYCHOTIC FEATURES: Status: ACTIVE | Noted: 2023-11-15

## 2023-11-15 PROBLEM — F41.9 ANXIETY: Chronic | Status: ACTIVE | Noted: 2023-11-15

## 2023-11-15 PROBLEM — F33.2 SEVERE EPISODE OF RECURRENT MAJOR DEPRESSIVE DISORDER, WITHOUT PSYCHOTIC FEATURES: Chronic | Status: ACTIVE | Noted: 2023-11-15

## 2023-11-15 NOTE — ASSESSMENT & PLAN NOTE
Patient's depression is recurrent and is mild without psychosis. Their depression is currently active and the condition is improving with treatment. This will be reassessed at the next regular appointment. F/U as described:patient depression is being managed by their pcp.

## 2024-03-12 ENCOUNTER — TELEPHONE (OUTPATIENT)
Dept: INTERNAL MEDICINE | Facility: CLINIC | Age: 59
End: 2024-03-12
Payer: COMMERCIAL

## 2024-05-14 ENCOUNTER — OFFICE VISIT (OUTPATIENT)
Dept: INTERNAL MEDICINE | Facility: CLINIC | Age: 59
End: 2024-05-14
Payer: COMMERCIAL

## 2024-05-14 VITALS
HEIGHT: 66 IN | WEIGHT: 248.8 LBS | SYSTOLIC BLOOD PRESSURE: 134 MMHG | HEART RATE: 102 BPM | BODY MASS INDEX: 39.98 KG/M2 | OXYGEN SATURATION: 97 % | TEMPERATURE: 98.7 F | DIASTOLIC BLOOD PRESSURE: 90 MMHG

## 2024-05-14 DIAGNOSIS — F17.219 CIGARETTE NICOTINE DEPENDENCE WITH NICOTINE-INDUCED DISORDER: Chronic | ICD-10-CM

## 2024-05-14 DIAGNOSIS — E66.01 MORBID (SEVERE) OBESITY DUE TO EXCESS CALORIES: ICD-10-CM

## 2024-05-14 DIAGNOSIS — M79.604 BILATERAL LEG PAIN: ICD-10-CM

## 2024-05-14 DIAGNOSIS — M79.605 BILATERAL LEG PAIN: ICD-10-CM

## 2024-05-14 DIAGNOSIS — F33.2 SEVERE EPISODE OF RECURRENT MAJOR DEPRESSIVE DISORDER, WITHOUT PSYCHOTIC FEATURES: ICD-10-CM

## 2024-05-14 DIAGNOSIS — I83.813 VARICOSE VEINS OF BOTH LOWER EXTREMITIES WITH PAIN: ICD-10-CM

## 2024-05-14 DIAGNOSIS — F41.9 ANXIETY: Primary | ICD-10-CM

## 2024-05-14 PROCEDURE — 99417 PROLNG OP E/M EACH 15 MIN: CPT

## 2024-05-14 PROCEDURE — 99215 OFFICE O/P EST HI 40 MIN: CPT

## 2024-05-14 RX ORDER — VARENICLINE TARTRATE 1 MG/1
1 TABLET, FILM COATED ORAL 2 TIMES DAILY
Qty: 56 TABLET | Refills: 1 | Status: SHIPPED | OUTPATIENT
Start: 2024-06-11 | End: 2024-08-06

## 2024-05-14 RX ORDER — HYDROXYZINE HYDROCHLORIDE 25 MG/1
25 TABLET, FILM COATED ORAL 3 TIMES DAILY PRN
Qty: 90 TABLET | Refills: 0 | Status: SHIPPED | OUTPATIENT
Start: 2024-05-14

## 2024-05-14 RX ORDER — KETOCONAZOLE 20 MG/G
CREAM TOPICAL
COMMUNITY
Start: 2024-04-30

## 2024-05-14 RX ORDER — VARENICLINE TARTRATE 0.5 (11)-1
KIT ORAL
Qty: 1 EACH | Refills: 0 | Status: SHIPPED | OUTPATIENT
Start: 2024-05-14 | End: 2024-06-11

## 2024-05-14 NOTE — PROGRESS NOTES
Chief Complaint  Weight Loss, Leg Problem, Anxiety, Depression, and Nicotine Dependence     Subjective:      History of Present Illness {CC  Problem List  Visit  Diagnosis   Encounters  Notes  Medications  Labs  Result Review Imaging  Media :23}     Carola Joiner presents to [facility] for:      History of Present Illness     Answers submitted by the patient for this visit:  Other (Submitted on 5/7/2024)  Please describe your symptoms.: I’m having some trouble with leg pain. I have a lot of vein breakage in both legs with increasing pain in my left leg. Feels like some veins are bulging. This started about three weeks ago. I would also like to discuss weight loss options.  Have you had these symptoms before?: No  How long have you been having these symptoms?: Greater than 2 weeks  Please describe any probable cause for these symptoms. 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 About twenty years of employment. The last five years I’ve been much more sedentary. I’m also very overweight.  Primary Reason for Visit (Submitted on 5/7/2024)  What is the primary reason for your visit?: Other    Anxiety/ Depression- pt was started on Wellbutrin  10/12/2023.               - pt states she is doing really well on wellbutrin.  Patient states that she is going through a really tough time with her son right now.      Leg pain- pt states that she has always had varicose veins, but recently this has started to worsen and cause more pain. Pt states most pain is when she is moving. Pt has tried to wear compression stockings, but states she does not wear them constantly.     Weight loss- pt has lost 100lbs twice in life. Pt is trying WW at this time. Pt states she has a portion control issue. Pt has lost 7 lbs recently, but with her mental health currently this is hard to stay motivated.     Smoking cessation- pt's  just was prescribed  "chantix and states that shew would like to quit with him.       I have reviewed patient's medical history, any new submitted information provided by patient or medical assistant and updated medical record.      Objective:      Physical Exam  Vitals reviewed.   Constitutional:       Appearance: Normal appearance. She is obese.   HENT:      Head: Normocephalic.   Cardiovascular:      Rate and Rhythm: Normal rate and regular rhythm.      Pulses: Normal pulses.      Heart sounds: Normal heart sounds.   Pulmonary:      Effort: Pulmonary effort is normal.      Breath sounds: Normal breath sounds.   Skin:     General: Skin is warm and dry.      Capillary Refill: Capillary refill takes less than 2 seconds.      Comments: Varicose veins on bilat legs   Neurological:      General: No focal deficit present.      Mental Status: She is alert.   Psychiatric:         Mood and Affect: Mood normal.         Behavior: Behavior normal.        Result Review  Data Reviewed:{ Labs  Result Review  Imaging  Med Tab  Media :23}                Vital Signs:   /90 (BP Location: Left arm, Patient Position: Sitting, Cuff Size: Large Adult)   Pulse 102   Temp 98.7 °F (37.1 °C) (Oral)   Ht 167.6 cm (66\")   Wt 113 kg (248 lb 12.8 oz)   SpO2 97%   BMI 40.16 kg/m²   Estimated body mass index is 40.16 kg/m² as calculated from the following:    Height as of this encounter: 167.6 cm (66\").    Weight as of this encounter: 113 kg (248 lb 12.8 oz).        Requested Prescriptions     Signed Prescriptions Disp Refills    hydrOXYzine (ATARAX) 25 MG tablet 90 tablet 0     Sig: Take 1 tablet by mouth 3 (Three) Times a Day As Needed for Anxiety.    Varenicline Tartrate, Starter, 0.5 MG X 11 & 1 MG X 42 tablet therapy pack 1 each 0     Sig: Take 0.5 mg by mouth Daily for 3 days, THEN 0.5 mg 2 (Two) Times a Day for 4 days, THEN 1 mg 2 (Two) Times a Day for 21 days. Take 0.5 mg po daily x 3 days, then 0.5 mg po bid x 4 days, then 1 mg po bid    " varenicline (Chantix Continuing Month Pak) 1 MG tablet 56 tablet 1     Sig: Take 1 tablet by mouth 2 (Two) Times a Day for 56 days.       Routine medications provided by this office will also be refilled via pharmacy request.       Current Outpatient Medications:     buPROPion XL (Wellbutrin XL) 300 MG 24 hr tablet, Take 1 tablet by mouth Daily., Disp: 90 tablet, Rfl: 3    fluconazole (DIFLUCAN) 150 MG tablet, , Disp: , Rfl:     ketoconazole (NIZORAL) 2 % cream, , Disp: , Rfl:     nystatin-triamcinolone (MYCOLOG) 929006-6.1 UNIT/GM-% ointment, , Disp: , Rfl:     hydrOXYzine (ATARAX) 25 MG tablet, Take 1 tablet by mouth 3 (Three) Times a Day As Needed for Anxiety., Disp: 90 tablet, Rfl: 0    [START ON 6/11/2024] varenicline (Chantix Continuing Month Pak) 1 MG tablet, Take 1 tablet by mouth 2 (Two) Times a Day for 56 days., Disp: 56 tablet, Rfl: 1    Varenicline Tartrate, Starter, 0.5 MG X 11 & 1 MG X 42 tablet therapy pack, Take 0.5 mg by mouth Daily for 3 days, THEN 0.5 mg 2 (Two) Times a Day for 4 days, THEN 1 mg 2 (Two) Times a Day for 21 days. Take 0.5 mg po daily x 3 days, then 0.5 mg po bid x 4 days, then 1 mg po bid, Disp: 1 each, Rfl: 0     Assessment and Plan:      Assessment and Plan {CC Problem List  Visit Diagnosis  ROS  Review (Popup)  [facility] Maintenance  Quality  BestPractice  Medications  SmartSets  SnapShot Encounters  Media :23}     Diagnoses and all orders for this visit:    1. Anxiety (Primary)  -     hydrOXYzine (ATARAX) 25 MG tablet; Take 1 tablet by mouth 3 (Three) Times a Day As Needed for Anxiety.  Dispense: 90 tablet; Refill: 0    2. Severe episode of recurrent major depressive disorder, without psychotic features  -     hydrOXYzine (ATARAX) 25 MG tablet; Take 1 tablet by mouth 3 (Three) Times a Day As Needed for Anxiety.  Dispense: 90 tablet; Refill: 0    3. Bilateral leg pain  -     Ambulatory Referral to Vascular Surgery    4. Varicose veins of both lower extremities with  pain  -     Ambulatory Referral to Vascular Surgery    5. Cigarette nicotine dependence with nicotine-induced disorder  -     Varenicline Tartrate, Starter, 0.5 MG X 11 & 1 MG X 42 tablet therapy pack; Take 0.5 mg by mouth Daily for 3 days, THEN 0.5 mg 2 (Two) Times a Day for 4 days, THEN 1 mg 2 (Two) Times a Day for 21 days. Take 0.5 mg po daily x 3 days, then 0.5 mg po bid x 4 days, then 1 mg po bid  Dispense: 1 each; Refill: 0  -     varenicline (Chantix Continuing Month Justin) 1 MG tablet; Take 1 tablet by mouth 2 (Two) Times a Day for 56 days.  Dispense: 56 tablet; Refill: 1    6. Morbid (severe) obesity due to excess calories             New Medications Ordered This Visit   Medications    hydrOXYzine (ATARAX) 25 MG tablet     Sig: Take 1 tablet by mouth 3 (Three) Times a Day As Needed for Anxiety.     Dispense:  90 tablet     Refill:  0    Varenicline Tartrate, Starter, 0.5 MG X 11 & 1 MG X 42 tablet therapy pack     Sig: Take 0.5 mg by mouth Daily for 3 days, THEN 0.5 mg 2 (Two) Times a Day for 4 days, THEN 1 mg 2 (Two) Times a Day for 21 days. Take 0.5 mg po daily x 3 days, then 0.5 mg po bid x 4 days, then 1 mg po bid     Dispense:  1 each     Refill:  0    varenicline (Chantix Continuing Month Pak) 1 MG tablet     Sig: Take 1 tablet by mouth 2 (Two) Times a Day for 56 days.     Dispense:  56 tablet     Refill:  1     Pt to see about weight loss medication coverage.  Educated patient on prescription Wegovy and Mounjaro and also compounding.  Explained the risks and benefits of both.  Explained side effects to patient.  Patient would like to wait to try smoking cessation first and then try weight loss medication.   Will rpescribe chantix for smoking cessation-educated patient heavily on the risks of worsening depression on Chantix.  Educated patient that she would need to monitor herself for suicide ideations.  Educated patient that if this occurs to immediately stop the medication and be seen in the ER.   Patient states her  in her room had long discussions regarding watching each other for worsening depression.  Patient is very aware of the risks of taking Chantix.  Patient would still like to try this as she really wants to quit smoking.  Will send pt to vascular surgery for bilat leg pain.  Educated pt on dosing and s/e of atarax.     I spent 64 minutes caring for Carola on this date of service. This time includes time spent by me in the following activities: preparing for the visit, reviewing tests, obtaining and/or reviewing a separately obtained history, performing a medically appropriate examination and/or evaluation, counseling and educating the patient/family/caregiver, ordering medications, tests, or procedures, referring and communicating with other health care professionals, documenting information in the medical record, independently interpreting results and communicating that information with the patient/family/caregiver, care coordination, and pt had many questons and health topics      Follow Up {Instructions Charge Capture  Follow-up Communications :23}     Return in about 3 months (around 8/14/2024) for Recheck.      Patient was given instructions and counseling regarding her condition or for health maintenance advice. Please see specific information pulled into the AVS if appropriate.    Dragon disclaimer:   Much of this encounter note is an electronic transcription/translation of spoken language to printed text. The electronic translation of spoken language may permit erroneous, or at times, nonsensical words or phrases to be inadvertently transcribed; Although I have reviewed the note for such errors, some may still exist.     Additional Patient Counseling:       There are no Patient Instructions on file for this visit.

## 2024-05-15 ENCOUNTER — OFFICE VISIT (OUTPATIENT)
Age: 59
End: 2024-05-15
Payer: COMMERCIAL

## 2024-05-15 VITALS
HEIGHT: 66 IN | BODY MASS INDEX: 39.86 KG/M2 | SYSTOLIC BLOOD PRESSURE: 124 MMHG | WEIGHT: 248 LBS | DIASTOLIC BLOOD PRESSURE: 80 MMHG

## 2024-05-15 DIAGNOSIS — I87.302 VENOUS HYPERTENSION OF LEFT LOWER EXTREMITY: Primary | ICD-10-CM

## 2024-05-15 NOTE — PROGRESS NOTES
"Chief Complaint  New Patient (Bilateral varicose vein, left leg worse. )    Subjective      History of Present Illness  Carola Joiner presents to [facility] as a new patient with complaints of painful varicose veins to her left leg.    Past History:  Medical History: has no past medical history on file.   Surgical History: has no past surgical history on file.   Family History: family history is not on file.   Social History: reports that she has been smoking cigarettes. She has a 37.5 pack-year smoking history. She has never used smokeless tobacco.    (Not in a hospital admission)     Allergies: Cephalexin     Carola Joiner  reports that she has been smoking cigarettes. She has a 37.5 pack-year smoking history. She has never used smokeless tobacco..       Objective   Vital Signs:  /80   Ht 167.6 cm (65.98\")   Wt 112 kg (248 lb)   BMI 40.05 kg/m²   Estimated body mass index is 40.05 kg/m² as calculated from the following:    Height as of this encounter: 167.6 cm (65.98\").    Weight as of this encounter: 112 kg (248 lb).             Physical Exam   Venous:Telangiectasia  Reticular Vein(s)  Varicose Veins  CEAP Classification: 2    Result Review :                     Assessment and Plan     Diagnoses and all orders for this visit:    1. Venous hypertension of left lower extremity (Primary)  -     Venous w Reflux Lower Extremity - Unilateral CAR; Future     Carola Joiner is a 58 y.o. female who presents as a new patient with complaints of painful varicose veins to her left leg.  She has noticed her telangiectasia spider veins since she was in her 20s and those have gradually worsened over time.  She has really not had significant pain until a month or so ago.  She describes the pain in her left leg as throbbing and tight.  This is worse with prolonged standing and at the end of the day.  She occasionally does get some swelling to that leg if she has been on her feet for longer than " 8 hours.  She has tried elevation and Aleve with only mild and temporary relief of her symptoms.  She has never had any surgeries or traumas to her legs.  No history of DVT or superficial thrombophlebitis.    We have discussed the natural history and pathophysiology of venous insufficiency as well as varicose veins.  She was measured for graded compression stockings.  She is to have left lower extremity class I vein mapping and reflux study done.  The physician will then discuss the results and her treatment options.           Follow Up     Return in about 6 weeks (around 6/26/2024) for assined MD s/p left Class 1 vein mapping and reflux study.  Patient was given instructions and counseling regarding her condition or for health maintenance advice. Please see specific information pulled into the AVS if appropriate.

## 2024-07-17 NOTE — PROGRESS NOTES
"Chief Complaint  Follow-up (Vein Mapping follow up. )    Subjective      HPI: Carola Joiner is a 58 y.o. female initially evaluated by our nurse practitioner Samantha 5/15/2024 for varicose veins of the left lower extremity with pain.  She was given a prescription for compression stockings and noninvasive testing was advised.  Problems began when she developed popping sensation in the region of the left popliteal fossa posteriorly about 3 months ago.  There was no obvious cause.  Since then she has had pain in the popliteal area, spiraling medially to the medial calf on a daily basis, though it has improved some since onset.  There are varicose veins in the area.  She has been wearing compression stockings as instructed without improvement.  No new symptoms.    Objective   Vital Signs:  /74   Ht 167.6 cm (65.98\")   Wt 112 kg (248 lb)   BMI 40.05 kg/m²   Estimated body mass index is 40.05 kg/m² as calculated from the following:    Height as of this encounter: 167.6 cm (65.98\").    Weight as of this encounter: 112 kg (248 lb).        Carola Joiner  reports that she has been smoking cigarettes. She has a 37.5 pack-year smoking history. She has never used smokeless tobacco..     Exam: BMI 40.  Pedal pulses easily palpated.  No varicose veins on the right.  There are 6 mm nontender, compressible varicose veins in the posterolateral proximal to mid left thigh, extending downward toward the popliteal area and then into the lateral calf, without clear proximal extension.  There are scattered spider veins in both lower extremities, including in the medial and lateral thighs and calves.  The limbs are large, in keeping with body habitus, but there is no pitting edema.  No skin changes.  Right and left ankle and calf circumferences measure 23 and 45 cm bilaterally, respectively.    Personal review of data: Lower extremity vein map images and tracings performed today.     Assessment and Plan     Diagnoses and all orders " for this visit:    1. Varicose veins of both lower extremities with pain (Primary)    2. Left leg pain    Summary: 58-year-old woman with left lower extremity pain and varicose veins.  The association of her pain in her varicose veins is not entirely clear to me, as the popping sensation and the pain extending into the medial calf do not necessarily go along with her veins.  Duplex demonstrates veins in the distribution of the lateral marginal vein, apparently with a pelvic and perhaps posterior SFJ origin.  However there are no varicosities in the medial calf where she is having a significant amount of pain.  From an anatomic perspective she is a candidate for Varithena or even physician-compounded foam sclerotherapy for treatment of these varicose veins.  Noninterventional management is also an option.  I discussed the nature of the duplex findings and treatment options in great detail.  I also discussed how her symptoms may be unrelated.  Another problem is that for reasons entirely related to insurance authorization protocols, and not to her medical condition, this would not be expected to be a covered benefit.  Thus she would have to proceed at her own cost.  I do not believe that an isolated phlebectomy would be an option either, as this would also not be considered medically necessary according to insurance plans and would be much more expensive.  Her initial impression is that she would not like to proceed with any sort of intervention so long as symptoms are not severe, especially 1 that may not be covered under insurance.  She will continue to monitor her symptoms, perhaps wear compression stockings depending on symptom improvement, and return again on request.    Follow Up     No follow-ups on file.  Patient was given instructions and counseling regarding her condition or for health maintenance advice. Please see specific information pulled into the AVS if appropriate.

## 2024-07-18 ENCOUNTER — OFFICE VISIT (OUTPATIENT)
Age: 59
End: 2024-07-18
Payer: COMMERCIAL

## 2024-07-18 ENCOUNTER — HOSPITAL ENCOUNTER (OUTPATIENT)
Facility: HOSPITAL | Age: 59
Discharge: HOME | End: 2024-07-18
Admitting: NURSE PRACTITIONER
Payer: COMMERCIAL

## 2024-07-18 VITALS
BODY MASS INDEX: 39.86 KG/M2 | HEIGHT: 66 IN | SYSTOLIC BLOOD PRESSURE: 118 MMHG | DIASTOLIC BLOOD PRESSURE: 74 MMHG | WEIGHT: 248 LBS

## 2024-07-18 DIAGNOSIS — M79.605 LEFT LEG PAIN: ICD-10-CM

## 2024-07-18 DIAGNOSIS — I83.813 VARICOSE VEINS OF BOTH LOWER EXTREMITIES WITH PAIN: Primary | ICD-10-CM

## 2024-07-18 DIAGNOSIS — I87.302 VENOUS HYPERTENSION OF LEFT LOWER EXTREMITY: ICD-10-CM

## 2024-07-18 LAB
BH CV LEFT LOWER VAS COMMON FEMORAL REFLUX TIME: 2.72 SEC
BH CV LEFT LOWER VAS GSV KNEE TRANSVERSE DIAMETER: 0.31 CM
BH CV LEFT LOWER VAS GSV MID CALF TRANS DIAMETER: 0.26 CM
BH CV LEFT LOWER VAS GSV MID TRANSVERSE DIAMETER: 0.3 CM
BH CV LEFT LOWER VAS GSV PROX REFLUX TIME: 0.65 SEC
BH CV LEFT LOWER VAS GSV PROX TRANSVERSE DIAMETER: 0.53 CM
BH CV LEFT LOWER VAS GSVBELOW KNEE TRANSVERSE DIAMETER: 0.32 CM
BH CV LEFT LOWER VAS SAPHENOFEMORAL JUNCTION REFLUX TIME: 2.2 SEC
BH CV LEFT LOWER VAS SAPHENOFEMORAL JUNCTION TRANSVERSE DIAMETER: 0.64 CM
BH CV LEFT LOWER VAS SSV MID CALF TRANS DIAMETER: 0.19 CM
BH CV LEFT LOWER VAS SSV PROX CALF TRANS DIAMETER: 0.14 CM
BH CV LEFT LOWER VAS VARICOSITY AK REFLUX TIME: 4.94 SEC
BH CV LEFT LOWER VAS VARICOSITY AK TRANS DIAMETER: 0.47 CM
BH CV LEFT LOWER VAS VARICOSITY BK REFLUX TIME: 2.95 SEC
BH CV LEFT LOWER VAS VARICOSITY BK TRANS DIAMETER: 0.56 CM
BH CV LOW VAS LEFT EXTERNAL ILIAC AUGMENT: NORMAL
BH CV LOW VAS LEFT EXTERNAL ILIAC COMPRESS: NORMAL
BH CV LOWER VAS LEFT GSV DIST THIGH COMPRESSIBILTY: NORMAL
BH CV LOWER VAS LEFT GSV MID CALF COMPRESSIBILTY: NORMAL
BH CV LOWER VAS LEFT GSV MID THIGH COMPRESSIBILTY: NORMAL
BH CV LOWER VASCULAR LEFT COMMON FEMORAL AUGMENT: NORMAL
BH CV LOWER VASCULAR LEFT COMMON FEMORAL COMPETENT: NORMAL
BH CV LOWER VASCULAR LEFT COMMON FEMORAL COMPRESS: NORMAL
BH CV LOWER VASCULAR LEFT COMMON FEMORAL PHASIC: NORMAL
BH CV LOWER VASCULAR LEFT COMMON FEMORAL SPONT: NORMAL
BH CV LOWER VASCULAR LEFT DISTAL FEMORAL COMPRESS: NORMAL
BH CV LOWER VASCULAR LEFT EXTERNAL ILIAC COMPETENT: NORMAL
BH CV LOWER VASCULAR LEFT EXTERNAL ILIAC PHASIC: NORMAL
BH CV LOWER VASCULAR LEFT EXTERNAL ILIAC SPONT: NORMAL
BH CV LOWER VASCULAR LEFT GASTRONEMIUS COMPRESS: NORMAL
BH CV LOWER VASCULAR LEFT GREATER SAPH AK COMPETENT: NORMAL
BH CV LOWER VASCULAR LEFT GREATER SAPH AK COMPRESS: NORMAL
BH CV LOWER VASCULAR LEFT GREATER SAPH BK COMPRESS: NORMAL
BH CV LOWER VASCULAR LEFT GSV DIST THIGH COMPETENT: NORMAL
BH CV LOWER VASCULAR LEFT LESSER SAPH COMPETENT: NORMAL
BH CV LOWER VASCULAR LEFT LESSER SAPH COMPRESS: NORMAL
BH CV LOWER VASCULAR LEFT MID FEMORAL AUGMENT: NORMAL
BH CV LOWER VASCULAR LEFT MID FEMORAL COMPETENT: NORMAL
BH CV LOWER VASCULAR LEFT MID FEMORAL COMPRESS: NORMAL
BH CV LOWER VASCULAR LEFT MID FEMORAL PHASIC: NORMAL
BH CV LOWER VASCULAR LEFT MID FEMORAL SPONT: NORMAL
BH CV LOWER VASCULAR LEFT PERONEAL AUGMENT: NORMAL
BH CV LOWER VASCULAR LEFT PERONEAL COMPRESS: NORMAL
BH CV LOWER VASCULAR LEFT POPLITEAL AUGMENT: NORMAL
BH CV LOWER VASCULAR LEFT POPLITEAL COMPETENT: NORMAL
BH CV LOWER VASCULAR LEFT POPLITEAL COMPRESS: NORMAL
BH CV LOWER VASCULAR LEFT POSTERIOR TIBIAL AUGMENT: NORMAL
BH CV LOWER VASCULAR LEFT POSTERIOR TIBIAL COMPRESS: NORMAL
BH CV LOWER VASCULAR LEFT PROFUNDA FEMORAL COMPRESS: NORMAL
BH CV LOWER VASCULAR LEFT PROXIMAL FEMORAL COMPRESS: NORMAL
BH CV LOWER VASCULAR LEFT SAPHENOFEMORAL JUNCTION AUGMENT: NORMAL
BH CV LOWER VASCULAR LEFT SAPHENOFEMORAL JUNCTION COMPETENT: NORMAL
BH CV LOWER VASCULAR LEFT SAPHENOFEMORAL JUNCTION COMPRESS: NORMAL
BH CV LOWER VASCULAR LEFT SAPHENOFEMORAL JUNCTION PHASIC: NORMAL
BH CV LOWER VASCULAR LEFT SAPHENOFEMORAL JUNCTION SPONT: NORMAL
BH CV LOWER VASCULAR LEFT SAPHENOPOP JX PHASIC: NORMAL
BH CV LOWER VASCULAR LEFT SOLEAL COMPRESS: NORMAL
BH CV LOWER VASCULAR LEFT SSV MID CALF COMPRESS: NORMAL
BH CV LOWER VASCULAR RIGHT COMMON FEMORAL AUGMENT: NORMAL
BH CV LOWER VASCULAR RIGHT COMMON FEMORAL COMPETENT: NORMAL
BH CV LOWER VASCULAR RIGHT COMMON FEMORAL COMPRESS: NORMAL
BH CV LOWER VASCULAR RIGHT COMMON FEMORAL PHASIC: NORMAL
BH CV LOWER VASCULAR RIGHT COMMON FEMORAL SPONT: NORMAL

## 2024-07-18 PROCEDURE — 93971 EXTREMITY STUDY: CPT

## 2024-08-14 ENCOUNTER — OFFICE VISIT (OUTPATIENT)
Dept: INTERNAL MEDICINE | Facility: CLINIC | Age: 59
End: 2024-08-14
Payer: COMMERCIAL

## 2024-08-14 VITALS
HEART RATE: 86 BPM | OXYGEN SATURATION: 98 % | HEIGHT: 65 IN | DIASTOLIC BLOOD PRESSURE: 80 MMHG | SYSTOLIC BLOOD PRESSURE: 116 MMHG | TEMPERATURE: 98.5 F | BODY MASS INDEX: 41.09 KG/M2 | WEIGHT: 246.6 LBS

## 2024-08-14 DIAGNOSIS — L30.9 DERMATITIS: Primary | ICD-10-CM

## 2024-08-14 DIAGNOSIS — F33.2 SEVERE EPISODE OF RECURRENT MAJOR DEPRESSIVE DISORDER, WITHOUT PSYCHOTIC FEATURES: ICD-10-CM

## 2024-08-14 DIAGNOSIS — F41.9 ANXIETY: ICD-10-CM

## 2024-08-14 PROCEDURE — 99214 OFFICE O/P EST MOD 30 MIN: CPT

## 2024-08-14 RX ORDER — KETOCONAZOLE 20 MG/G
CREAM TOPICAL DAILY
Qty: 30 G | Refills: 1 | Status: SHIPPED | OUTPATIENT
Start: 2024-08-14

## 2024-08-14 RX ORDER — HYDROXYZINE HYDROCHLORIDE 25 MG/1
25 TABLET, FILM COATED ORAL 3 TIMES DAILY PRN
Qty: 90 TABLET | Refills: 1 | Status: SHIPPED | OUTPATIENT
Start: 2024-08-14

## 2024-10-05 DIAGNOSIS — F33.2 SEVERE EPISODE OF RECURRENT MAJOR DEPRESSIVE DISORDER, WITHOUT PSYCHOTIC FEATURES: ICD-10-CM

## 2024-10-05 DIAGNOSIS — F41.9 ANXIETY: ICD-10-CM

## 2024-10-07 RX ORDER — HYDROXYZINE HYDROCHLORIDE 25 MG/1
25 TABLET, FILM COATED ORAL 3 TIMES DAILY PRN
Qty: 90 TABLET | Refills: 0 | Status: SHIPPED | OUTPATIENT
Start: 2024-10-07

## 2024-10-07 NOTE — TELEPHONE ENCOUNTER
Rx Refill Note  Requested Prescriptions     Pending Prescriptions Disp Refills    hydrOXYzine (ATARAX) 25 MG tablet [Pharmacy Med Name: hydrOXYzine HCl Oral Tablet 25 MG] 90 tablet 0     Sig: TAKE ONE TABLET BY MOUTH THREE TIMES DAILY AS NEEDED FOR ANXIETY      Last office visit with prescribing clinician: 8/14/2024   Last telemedicine visit with prescribing clinician: Visit date not found   Next office visit with prescribing clinician: 11/14/2024                         Would you like a call back once the refill request has been completed: [] Yes [] No    If the office needs to give you a call back, can they leave a voicemail: [] Yes [] No    Polly England MA  10/07/24, 08:51 EDT

## 2024-11-05 ENCOUNTER — TELEPHONE (OUTPATIENT)
Dept: INTERNAL MEDICINE | Facility: CLINIC | Age: 59
End: 2024-11-05
Payer: COMMERCIAL

## 2024-11-05 DIAGNOSIS — Z00.00 ANNUAL PHYSICAL EXAM: Primary | ICD-10-CM

## 2024-11-05 DIAGNOSIS — Z13.220 SCREENING FOR CHOLESTEROL LEVEL: ICD-10-CM

## 2024-11-05 DIAGNOSIS — F41.9 ANXIETY: Chronic | ICD-10-CM

## 2024-11-05 DIAGNOSIS — R73.03 PREDIABETES: ICD-10-CM

## 2024-11-05 DIAGNOSIS — Z13.228 SCREENING FOR METABOLIC DISORDER: ICD-10-CM

## 2024-11-05 DIAGNOSIS — Z13.29 SCREENING FOR THYROID DISORDER: ICD-10-CM

## 2024-11-05 DIAGNOSIS — Z13.0 SCREENING FOR DEFICIENCY ANEMIA: ICD-10-CM

## 2024-11-05 DIAGNOSIS — Z13.1 SCREENING FOR DIABETES MELLITUS: ICD-10-CM

## 2024-11-05 NOTE — TELEPHONE ENCOUNTER
Caller: Carola Joiner    Relationship: Self    Best call back number: [phone]        What was the call regarding: PATIENT CALLING IN WANTING TO CHANGE LAB APPOINTMENT FOR 11/7/24 TO TOMORROW, WEDNESDAY OR FRIDAY     SHE COULD ALSO DO THURSDAY BUT HAS TO BE AFTER 10AM     PLEASE GIVE CALLBACK TO GIVE PATIENT APPOINTMENT DATE    TRIED TO WARM TRANSFER NO ANSWER

## 2024-11-14 ENCOUNTER — OFFICE VISIT (OUTPATIENT)
Dept: INTERNAL MEDICINE | Facility: CLINIC | Age: 59
End: 2024-11-14
Payer: COMMERCIAL

## 2024-11-14 VITALS
HEART RATE: 79 BPM | HEIGHT: 65 IN | DIASTOLIC BLOOD PRESSURE: 86 MMHG | WEIGHT: 244.2 LBS | OXYGEN SATURATION: 99 % | TEMPERATURE: 99 F | BODY MASS INDEX: 40.69 KG/M2 | SYSTOLIC BLOOD PRESSURE: 114 MMHG

## 2024-11-14 DIAGNOSIS — F17.219 CIGARETTE NICOTINE DEPENDENCE WITH NICOTINE-INDUCED DISORDER: Chronic | ICD-10-CM

## 2024-11-14 DIAGNOSIS — Z91.89 AT RISK FOR CORONARY ARTERY DISEASE: ICD-10-CM

## 2024-11-14 DIAGNOSIS — H53.9 VISION ABNORMALITIES: ICD-10-CM

## 2024-11-14 DIAGNOSIS — Z12.11 SCREEN FOR COLON CANCER: ICD-10-CM

## 2024-11-14 DIAGNOSIS — F33.2 SEVERE EPISODE OF RECURRENT MAJOR DEPRESSIVE DISORDER, WITHOUT PSYCHOTIC FEATURES: Chronic | ICD-10-CM

## 2024-11-14 DIAGNOSIS — Z00.00 ROUTINE GENERAL MEDICAL EXAMINATION AT A HEALTH CARE FACILITY: Primary | ICD-10-CM

## 2024-11-14 DIAGNOSIS — Z13.6 HYPERTENSION SCREEN: ICD-10-CM

## 2024-11-14 DIAGNOSIS — Z13.6 SCREENING FOR CARDIOVASCULAR CONDITION: ICD-10-CM

## 2024-11-14 DIAGNOSIS — F41.9 ANXIETY: Chronic | ICD-10-CM

## 2024-11-14 DIAGNOSIS — L30.9 DERMATITIS: ICD-10-CM

## 2024-11-14 PROCEDURE — 99214 OFFICE O/P EST MOD 30 MIN: CPT

## 2024-11-14 PROCEDURE — 99396 PREV VISIT EST AGE 40-64: CPT

## 2024-11-14 RX ORDER — VARENICLINE TARTRATE 1 MG/1
1 TABLET, FILM COATED ORAL 2 TIMES DAILY
Qty: 56 TABLET | Refills: 1 | Status: SHIPPED | OUTPATIENT
Start: 2024-12-12 | End: 2025-02-06

## 2024-11-14 RX ORDER — KETOCONAZOLE 20 MG/G
CREAM TOPICAL DAILY
Qty: 30 G | Refills: 1 | Status: SHIPPED | OUTPATIENT
Start: 2024-11-14

## 2024-11-14 RX ORDER — HYDROXYZINE HYDROCHLORIDE 25 MG/1
25 TABLET, FILM COATED ORAL 3 TIMES DAILY PRN
Qty: 90 TABLET | Refills: 1 | Status: SHIPPED | OUTPATIENT
Start: 2024-11-14

## 2024-11-14 RX ORDER — VARENICLINE TARTRATE 0.5 (11)-1
KIT ORAL
Qty: 1 EACH | Refills: 0 | Status: SHIPPED | OUTPATIENT
Start: 2024-11-14 | End: 2024-12-12

## 2024-11-14 RX ORDER — BUPROPION HYDROCHLORIDE 300 MG/1
300 TABLET ORAL DAILY
Qty: 90 TABLET | Refills: 3 | Status: SHIPPED | OUTPATIENT
Start: 2024-11-14

## 2024-11-14 NOTE — PROGRESS NOTES
Chief Complaint  Annual Exam, Depression, Anxiety, Nicotine Dependence, Obesity, and Rash     Subjective:      History of Present Illness {CC  Problem List  Visit  Diagnosis   Encounters  Notes  Medications  Labs  Result Review Imaging  Media :23}     Carola Joiner presents to [facility] for:      History of Present Illness        The patient presents for a physical exam.    She reports an improvement in her mood, attributing it to the recent relocation of her mother to a safer environment. However, she has been experiencing some mood fluctuations over the past 2.5 weeks, which she believes are situational and manageable. She admits to not taking her anti-anxiety medication consistently and is seeking a refill of her Wellbutrin prescription.    She mentions a recent setback in her smoking cessation efforts and expresses interest in trying Chantix again, a medication she previously used without any adverse effects. She reports no use of vaping products.    Her physical activity is limited to yard work, which she does once or twice a week. She expresses a desire to lose weight and is considering Ozempic, but is unsure if her insurance will cover it. She has started taking vitamin C and is curious about the potential benefits of magnesium.    She has recently undergone a mammogram and a Pap smear, both of which were normal. She has not had a colonoscopy but is interested in the Cologuard test. She declines a lung cancer screening at this time.    She reports worsening vision and requests a referral to an ophthalmologist. She also requests a refill of her ketoconazole cream, which she uses for skin breakouts under her breast.    She reports no leg swelling but mentions an incident where she hurt her leg while doing yard work and has since been using compression socks.    SOCIAL HISTORY  She is still smoking.    FAMILY HISTORY  Her mother has issues with her colon and has  got colostomy.      Carola is here for coordination of medical care, to discuss health maintenance, disease prevention as well as to followup on medical problems.     Patient Care Team:  Orly Bautista APRN as PCP - General (Nurse Practitioner)  Alesia Dykes MD as Consulting Physician (Obstetrics and Gynecology)     Activity level is rarely.   Exercises 1 per week.      Health and Weight:   Weight trend is   Wt Readings from Last 4 Encounters:   11/14/24 111 kg (244 lb 3.2 oz)   08/14/24 112 kg (246 lb 9.6 oz)   07/18/24 112 kg (248 lb)   05/15/24 112 kg (248 lb)       Class 3 Severe Obesity (BMI >=40). Obesity-related health conditions include the following: none. Obesity is newly identified. BMI is is above average; BMI management plan is completed. We discussed low calorie, low carb based diet program, portion control, and increasing exercise.      Mental Health:   PHQ-2 Depression Screening  Little interest or pleasure in doing things?     Feeling down, depressed, or hopeless?     PHQ-2 Total Score       Health Maintenance Female:  GYN:  [facility]  Patient's last mammogram was  Last Completed Mammogram            MAMMOGRAM (Every 2 Years) Next due on 10/11/2025      10/11/2023  Patient-Reported (Performed Externally)                   Advised routine self-breast exams monthly.  Pap smears have been: normal   Postmenopausal: [] Yes     STI Screen:   [] HIV     [] Syphilis   [] Chlamydia/ Gonorrhea   [x] Declines STD screen  If tests ordered, patient was informed HIV results will not show up on my chart.     Colon cancer screen:   Patient's last colonoscopy was - never  Last Completed Colonoscopy       This patient has no relevant Health Maintenance data.           She was advised to repeat in Patient declines due to age.    Vaccines:   [] Flu     [] Tdap   [] Prevnar 20    [] Shingrix (shingles: series of 2)   [] COVID (booster)    []   [x]Declines vaccines      Last eye exam: 2024    Advise  regular sunscreen.      Her cardiovascular risks are:     [] No Known risk factors    [] Hypertension   [] Hyperlipidemia  [] Diabetes    [x] Obesity  [x] Family history   [x] Current or hx tobacco use  [] Sedentary lifestyle   [] Post-menopausal ;t       I have reviewed patient's medical history, any new submitted information provided by patient or medical assistant and updated medical record.      Objective:      Physical Exam  Vitals reviewed.   Constitutional:       General: She is awake. She is not in acute distress.     Appearance: Normal appearance. She is well-groomed. She is not ill-appearing, toxic-appearing or diaphoretic.   HENT:      Head: Normocephalic.      Right Ear: Hearing normal.      Left Ear: Hearing normal.      Nose: Nose normal.      Mouth/Throat:      Lips: Pink.      Mouth: Mucous membranes are moist.   Eyes:      General: Lids are normal. Vision grossly intact.         Right eye: No foreign body, discharge or hordeolum.         Left eye: No foreign body, discharge or hordeolum.      Extraocular Movements: Extraocular movements intact.      Conjunctiva/sclera: Conjunctivae normal.      Pupils: Pupils are equal, round, and reactive to light. Pupils are equal.   Neck:      Thyroid: No thyroid mass, thyromegaly or thyroid tenderness.      Vascular: No carotid bruit or JVD.      Trachea: Trachea and phonation normal.   Cardiovascular:      Rate and Rhythm: Normal rate and regular rhythm.      Pulses: Normal pulses.      Heart sounds: Normal heart sounds, S1 normal and S2 normal. No murmur heard.     No friction rub. No gallop.   Pulmonary:      Effort: Pulmonary effort is normal. No respiratory distress.      Breath sounds: Normal breath sounds. No stridor, decreased air movement or transmitted upper airway sounds. No decreased breath sounds, wheezing, rhonchi or rales.   Chest:      Chest wall: No tenderness.   Abdominal:      General: Abdomen is flat. Bowel sounds are normal. There is no  distension.      Palpations: Abdomen is soft. There is no mass.      Tenderness: There is no abdominal tenderness. There is no right CVA tenderness, left CVA tenderness, guarding or rebound.      Hernia: No hernia is present.   Musculoskeletal:         General: No swelling, tenderness, deformity or signs of injury. Normal range of motion.      Cervical back: Normal, full passive range of motion without pain and normal range of motion. No rigidity or tenderness.      Thoracic back: Normal.      Lumbar back: Normal.      Right lower leg: No edema.      Left lower leg: No edema.   Lymphadenopathy:      Head:      Right side of head: No submental, submandibular, tonsillar or preauricular adenopathy.      Left side of head: No submental, submandibular, tonsillar or preauricular adenopathy.      Cervical: No cervical adenopathy.   Skin:     General: Skin is warm and dry.      Capillary Refill: Capillary refill takes less than 2 seconds.      Coloration: Skin is not jaundiced or pale.      Findings: No bruising, erythema, lesion or rash.      Comments: Rash under bilateral breasts    Neurological:      General: No focal deficit present.      Mental Status: She is alert and oriented to person, place, and time. Mental status is at baseline.      Motor: No weakness.      Gait: Gait normal.   Psychiatric:         Attention and Perception: Attention and perception normal.         Mood and Affect: Mood and affect normal.         Speech: Speech normal.         Behavior: Behavior normal. Behavior is cooperative.         Thought Content: Thought content normal.         Cognition and Memory: Cognition and memory normal.         Judgment: Judgment normal.        Result Review  Data Reviewed:{ Labs  Result Review  Imaging  Med Tab  Media :23}     Results  Laboratory Studies  Glucose was slightly high at 101. A1c was normal. Thyroid function was normal. Total cholesterol was good, LDL was slightly elevated at 115. CBC was normal,  "with slightly elevated lymphocytes at 3.3.       The following data was reviewed by: RE Shearer on 11/14/2024  CMP          11/6/2024    09:51   CMP   Glucose 101    BUN 19    Creatinine 0.92    Sodium 140    Potassium 4.4    Chloride 104    Calcium 9.4    Total Protein 6.7    Albumin 4.1    Globulin 2.6    Total Bilirubin 0.3    Alkaline Phosphatase 96    AST (SGOT) 18    ALT (SGPT) 20    BUN/Creatinine Ratio 20.7      CBC          11/6/2024    09:51   CBC   WBC 9.73    RBC 4.79    Hemoglobin 14.1    Hematocrit 43.3    MCV 90.4    MCH 29.4    MCHC 32.6    RDW 12.5    Platelets 375      Lipid Panel          11/6/2024    09:51   Lipid Panel   Total Cholesterol 193    Triglycerides 111    HDL Cholesterol 58    VLDL Cholesterol 20    LDL Cholesterol  115      TSH          11/6/2024    09:51   TSH   TSH 1.050      Most Recent A1C          11/6/2024    09:51   HGBA1C Most Recent   Hemoglobin A1C 5.60             .ascvd    Vital Signs:   /86 (BP Location: Left arm, Patient Position: Sitting, Cuff Size: Large Adult)   Pulse 79   Temp 99 °F (37.2 °C) (Oral)   Ht 165.1 cm (65\")   Wt 111 kg (244 lb 3.2 oz)   SpO2 99%   BMI 40.64 kg/m²             The 10-year ASCVD risk score (Ashwini BAKER, et al., 2019) is: 4.4%    Values used to calculate the score:      Age: 59 years      Sex: Female      Is Non- : No      Diabetic: No      Tobacco smoker: Yes      Systolic Blood Pressure: 114 mmHg      Is BP treated: No      HDL Cholesterol: 58 mg/dL      Total Cholesterol: 193 mg/dL      Requested Prescriptions     Signed Prescriptions Disp Refills    buPROPion XL (Wellbutrin XL) 300 MG 24 hr tablet 90 tablet 3     Sig: Take 1 tablet by mouth Daily.    Varenicline Tartrate, Starter, 0.5 MG X 11 & 1 MG X 42 tablet therapy pack 1 each 0     Sig: Take 0.5 mg by mouth Daily for 3 days, THEN 0.5 mg 2 (Two) Times a Day for 4 days, THEN 1 mg 2 (Two) Times a Day for 21 days. Take 0.5 mg po daily " x 3 days, then 0.5 mg po bid x 4 days, then 1 mg po bid    varenicline (Chantix Continuing Month Justin) 1 MG tablet 56 tablet 1     Sig: Take 1 tablet by mouth 2 (Two) Times a Day for 56 days.    hydrOXYzine (ATARAX) 25 MG tablet 90 tablet 1     Sig: Take 1 tablet by mouth 3 (Three) Times a Day As Needed for Anxiety. for anxiety    ketoconazole (NIZORAL) 2 % cream 30 g 1     Sig: Apply  topically to the appropriate area as directed Daily.       Routine medications provided by this office will also be refilled via pharmacy request.       Current Outpatient Medications:     buPROPion XL (Wellbutrin XL) 300 MG 24 hr tablet, Take 1 tablet by mouth Daily., Disp: 90 tablet, Rfl: 3    hydrOXYzine (ATARAX) 25 MG tablet, Take 1 tablet by mouth 3 (Three) Times a Day As Needed for Anxiety. for anxiety, Disp: 90 tablet, Rfl: 1    ketoconazole (NIZORAL) 2 % cream, Apply  topically to the appropriate area as directed Daily., Disp: 30 g, Rfl: 1    [START ON 12/12/2024] varenicline (Chantix Continuing Month Justin) 1 MG tablet, Take 1 tablet by mouth 2 (Two) Times a Day for 56 days., Disp: 56 tablet, Rfl: 1    Varenicline Tartrate, Starter, 0.5 MG X 11 & 1 MG X 42 tablet therapy pack, Take 0.5 mg by mouth Daily for 3 days, THEN 0.5 mg 2 (Two) Times a Day for 4 days, THEN 1 mg 2 (Two) Times a Day for 21 days. Take 0.5 mg po daily x 3 days, then 0.5 mg po bid x 4 days, then 1 mg po bid, Disp: 1 each, Rfl: 0     Assessment and Plan:      Assessment and Plan {CC Problem List  Visit Diagnosis  ROS  Review (Popup)  Health Maintenance  Quality  BestPractice  Medications  SmartSets  SnapShot Encounters  Media :23}     Problem List Items Addressed This Visit       Anxiety (Chronic)    Relevant Medications    buPROPion XL (Wellbutrin XL) 300 MG 24 hr tablet    hydrOXYzine (ATARAX) 25 MG tablet    Severe episode of recurrent major depressive disorder, without psychotic features (Chronic)    Relevant Medications    buPROPion XL  (Wellbutrin XL) 300 MG 24 hr tablet    Varenicline Tartrate, Starter, 0.5 MG X 11 & 1 MG X 42 tablet therapy pack    varenicline (Chantix Continuing Month Justin) 1 MG tablet (Start on 12/12/2024)    hydrOXYzine (ATARAX) 25 MG tablet    Cigarette nicotine dependence with nicotine-induced disorder    Relevant Medications    buPROPion XL (Wellbutrin XL) 300 MG 24 hr tablet    Varenicline Tartrate, Starter, 0.5 MG X 11 & 1 MG X 42 tablet therapy pack    varenicline (Chantix Continuing Month Justin) 1 MG tablet (Start on 12/12/2024)    hydrOXYzine (ATARAX) 25 MG tablet     Other Visit Diagnoses       Routine general medical examination at a health care facility    -  Primary    At risk for coronary artery disease        Hypertension screen        Screen for colon cancer        Relevant Orders    Cologuard - Stool, Per Rectum    Screening for cardiovascular condition        Dermatitis        Relevant Medications    ketoconazole (NIZORAL) 2 % cream    Vision abnormalities        Relevant Orders    Ambulatory Referral to Ophthalmology (Completed)               1. Routine Physical Examination.  Her metabolic panel results are within normal limits, indicating healthy kidney, liver, and electrolyte function. Glucose levels are slightly elevated but not concerning. A1c levels are excellent, ruling out prediabetes. Thyroid function is normal. Cholesterol levels are satisfactory, with a slight increase in LDL, but not to a level of concern. Blood pressure is well-controlled. ASCVD risk is low at 4.4 percent. Complete blood count (CBC) is normal, with a minor elevation in lymphocytes, possibly due to a recent viral illness or allergies.    2. Smoking Cessation.  She has been smoking fewer cigarettes but has not completely quit. Chantix will be prescribed again to aid in smoking cessation. She is advised to avoid vaping as a method to quit smoking.    3. Depression.  She reports that her depression has improved but remains situationally  affected. She is advised to continue her current Wellbutrin regimen. A refill for Wellbutrin will be sent to her pharmacy. If her symptoms worsen, an increase in Wellbutrin or an additional medication may be considered.    4. Anxiety.  She has not been taking her antianxiety medication regularly. She is advised to resume regular use of her antianxiety medication. A refill will be provided.    5. Skin Infection.  She requires ketoconazole cream for a skin condition under her breast. A prescription for ketoconazole cream will be sent to her pharmacy.    6. Health Maintenance.  A Cologuard test will be ordered for colon cancer screening. She will be referred to an ophthalmologist for an eye examination.           Follow Up {Instructions Charge Capture  Follow-up Communications :23}     Return in about 6 months (around 5/14/2025) for Recheck.      Patient was given instructions and counseling regarding her condition or for health maintenance advice. Please see specific information pulled into the AVS if appropriate.    Dragon disclaimer:   Much of this encounter note is an electronic transcription/translation of spoken language to printed text. The electronic translation of spoken language may permit erroneous, or at times, nonsensical words or phrases to be inadvertently transcribed; Although I have reviewed the note for such errors, some may still exist.         Additional Patient Counseling:       There are no Patient Instructions on file for this visit.    Patient or patient representative verbalized consent for the use of Ambient Listening during the visit with  RE Shearer for chart documentation. 12/3/2024  21:29 EST

## 2025-01-03 ENCOUNTER — OFFICE VISIT (OUTPATIENT)
Dept: INTERNAL MEDICINE | Facility: CLINIC | Age: 60
End: 2025-01-03
Payer: COMMERCIAL

## 2025-01-03 VITALS
OXYGEN SATURATION: 97 % | HEIGHT: 65 IN | BODY MASS INDEX: 40.59 KG/M2 | WEIGHT: 243.6 LBS | SYSTOLIC BLOOD PRESSURE: 134 MMHG | DIASTOLIC BLOOD PRESSURE: 90 MMHG | HEART RATE: 107 BPM | TEMPERATURE: 98 F

## 2025-01-03 DIAGNOSIS — R05.1 ACUTE COUGH: Primary | ICD-10-CM

## 2025-01-03 DIAGNOSIS — J40 BRONCHITIS: ICD-10-CM

## 2025-01-03 DIAGNOSIS — J06.9 UPPER RESPIRATORY TRACT INFECTION, UNSPECIFIED TYPE: ICD-10-CM

## 2025-01-03 LAB
EXPIRATION DATE: NORMAL
FLUAV AG UPPER RESP QL IA.RAPID: NOT DETECTED
FLUBV AG UPPER RESP QL IA.RAPID: NOT DETECTED
INTERNAL CONTROL: NORMAL
Lab: NORMAL
SARS-COV-2 AG UPPER RESP QL IA.RAPID: NOT DETECTED

## 2025-01-03 PROCEDURE — 99213 OFFICE O/P EST LOW 20 MIN: CPT

## 2025-01-03 PROCEDURE — 87428 SARSCOV & INF VIR A&B AG IA: CPT

## 2025-01-03 RX ORDER — METHYLPREDNISOLONE 4 MG/1
TABLET ORAL
Qty: 21 TABLET | Refills: 0 | Status: SHIPPED | OUTPATIENT
Start: 2025-01-03

## 2025-01-03 RX ORDER — DEXTROMETHORPHAN HYDROBROMIDE AND PROMETHAZINE HYDROCHLORIDE 15; 6.25 MG/5ML; MG/5ML
5 SYRUP ORAL 4 TIMES DAILY PRN
Qty: 118 ML | Refills: 0 | Status: SHIPPED | OUTPATIENT
Start: 2025-01-03

## 2025-01-03 RX ORDER — AZITHROMYCIN 250 MG/1
TABLET, FILM COATED ORAL
Qty: 6 TABLET | Refills: 0 | Status: SHIPPED | OUTPATIENT
Start: 2025-01-03

## 2025-01-03 NOTE — PROGRESS NOTES
Chief Complaint  Cough and Fatigue     Subjective:      History of Present Illness {CC  Problem List  Visit  Diagnosis   Encounters  Notes  Medications  Labs  Result Review Imaging  Media :23}     Carola Joiner presents to [facility] for:      History of Present Illness        The patient presents for evaluation of a cough.    She reports a persistent cough that has been present for approximately one week, with a recent increase in severity. The cough is now deeper and more forceful, and she has begun to expectorate. She also reports nasal drainage, which has become less productive and more difficult to expel. She suspects she may have had a fever, although she did not measure her temperature, and she has experienced chills. She reports no ear pain but does experience mild throat discomfort during coughing episodes. She experienced mild shortness of breath while ascending a small hill, which she attributes to her overall feeling of malaise. She reports no associated pain. She has a history of annual bronchitis episodes prior to the COVID-19 pandemic and recalls being unable to tolerate Keflex. She has previously been prescribed Z-Justin, which she believes was effective. Despite self-treatment with Mucinex and NyQuil, there has been no significant improvement.    Supplemental Information  She takes Wellbutrin in the morning.    ALLERGIES  The patient has no known allergies.    MEDICATIONS  Current: Wellbutrin, Mucinex, NyQuil         I have reviewed patient's medical history, any new submitted information provided by patient or medical assistant and updated medical record.      Objective:      Physical Exam  Vitals reviewed.   Constitutional:       Appearance: Normal appearance.   HENT:      Head: Normocephalic.   Cardiovascular:      Rate and Rhythm: Normal rate and regular rhythm.      Pulses: Normal pulses.      Heart sounds: Normal heart sounds.   Pulmonary:       "Effort: Pulmonary effort is normal.      Breath sounds: Normal breath sounds.   Skin:     General: Skin is warm and dry.      Capillary Refill: Capillary refill takes less than 2 seconds.   Neurological:      General: No focal deficit present.      Mental Status: She is alert.   Psychiatric:         Mood and Affect: Mood normal.         Behavior: Behavior normal.        Result Review  Data Reviewed:{ Labs  Result Review  Imaging  Med Tab  Media :23}     Results                    Vital Signs:   /90 (BP Location: Left arm, Patient Position: Sitting, Cuff Size: Large Adult)   Pulse 107   Temp 98 °F (36.7 °C) (Oral)   Ht 165.1 cm (65\")   Wt 110 kg (243 lb 9.6 oz)   SpO2 97%   BMI 40.54 kg/m²                 Requested Prescriptions      No prescriptions requested or ordered in this encounter       Routine medications provided by this office will also be refilled via pharmacy request.       Current Outpatient Medications:     buPROPion XL (Wellbutrin XL) 300 MG 24 hr tablet, Take 1 tablet by mouth Daily., Disp: 90 tablet, Rfl: 3    hydrOXYzine (ATARAX) 25 MG tablet, Take 1 tablet by mouth 3 (Three) Times a Day As Needed for Anxiety. for anxiety, Disp: 90 tablet, Rfl: 1    ketoconazole (NIZORAL) 2 % cream, Apply  topically to the appropriate area as directed Daily., Disp: 30 g, Rfl: 1    varenicline (Chantix Continuing Month Justin) 1 MG tablet, Take 1 tablet by mouth 2 (Two) Times a Day for 56 days., Disp: 56 tablet, Rfl: 1     Assessment and Plan:      Assessment and Plan {CC Problem List  Visit Diagnosis  ROS  Review (Popup)  Health Maintenance  Quality  BestPractice  Medications  SmartSets  SnapShot Encounters  Media :23}     Problem List Items Addressed This Visit    None  Visit Diagnoses       Acute cough    -  Primary    Relevant Orders    POCT SARS-CoV-2 Antigen BREANNA + Flu (Completed)               1. Sinusitis.  The patient reports a week-long history of worsening cough, nasal congestion, " and thick nasal drainage. Examination reveals swollen lymph nodes, erythematous throat without white patches, and fluid behind the tympanic membranes. A prescription for Z-Justin (azithromycin) will be provided, with instructions to take two tablets on the first day, followed by one tablet daily for the subsequent four days. A Medrol Dosepak (methylprednisolone) will also be prescribed. She is advised to continue using plain Mucinex, maintain adequate hydration, rest, and utilize steam inhalation if beneficial. Over-the-counter Robitussin is recommended for cough management. A prescription for promethazine DM will be provided, to be taken four times daily, with a cautionary note about potential drowsiness. She is instructed to monitor her blood pressure at home. If there is no improvement by the fourth or fifth day of treatment, she should inform us so that an alternative antibiotic can be considered.    2. Bronchitis.  The patient may be experiencing the onset of bronchitis, given her symptoms and history of yearly bronchitis episodes before COVID-19. The prescribed Z-Justin and Medrol Dosepak should address this condition as well. She is advised to monitor her symptoms and report any lack of improvement by day four or five of the treatment.         Follow Up {Instructions Charge Capture  Follow-up Communications :23}     No follow-ups on file.      Patient was given instructions and counseling regarding her condition or for health maintenance advice. Please see specific information pulled into the AVS if appropriate.    Ze disclaimer:   Much of this encounter note is an electronic transcription/translation of spoken language to printed text. The electronic translation of spoken language may permit erroneous, or at times, nonsensical words or phrases to be inadvertently transcribed; Although I have reviewed the note for such errors, some may still exist.         Additional Patient Counseling:       There are no  Patient Instructions on file for this visit.    Patient or patient representative verbalized consent for the use of Ambient Listening during the visit with  RE Shearer for chart documentation. 1/30/2025  23:08 EST

## 2025-01-04 DIAGNOSIS — F41.9 ANXIETY: Chronic | ICD-10-CM

## 2025-01-04 DIAGNOSIS — F33.2 SEVERE EPISODE OF RECURRENT MAJOR DEPRESSIVE DISORDER, WITHOUT PSYCHOTIC FEATURES: Chronic | ICD-10-CM

## 2025-01-07 RX ORDER — HYDROXYZINE HYDROCHLORIDE 25 MG/1
25 TABLET, FILM COATED ORAL 3 TIMES DAILY PRN
Qty: 90 TABLET | Refills: 1 | Status: SHIPPED | OUTPATIENT
Start: 2025-01-07

## 2025-01-07 NOTE — TELEPHONE ENCOUNTER
Rx Refill Note  Requested Prescriptions     Pending Prescriptions Disp Refills    hydrOXYzine (ATARAX) 25 MG tablet [Pharmacy Med Name: hydrOXYzine HCl Oral Tablet 25 MG] 90 tablet 0     Sig: TAKE ONE TABLET BY MOUTH THREE TIMES A DAY AS NEEDED FOR ANXIETY      Last office visit with prescribing clinician: 1/3/2025   Last telemedicine visit with prescribing clinician: Visit date not found   Next office visit with prescribing clinician: 11/19/2025                         Would you like a call back once the refill request has been completed: [] Yes [] No    If the office needs to give you a call back, can they leave a voicemail: [] Yes [] No    Regine Velarde  01/07/25, 10:51 EST

## 2025-07-17 ENCOUNTER — OFFICE VISIT (OUTPATIENT)
Dept: INTERNAL MEDICINE | Facility: CLINIC | Age: 60
End: 2025-07-17
Payer: COMMERCIAL

## 2025-07-17 VITALS
HEART RATE: 109 BPM | BODY MASS INDEX: 39.44 KG/M2 | SYSTOLIC BLOOD PRESSURE: 122 MMHG | DIASTOLIC BLOOD PRESSURE: 90 MMHG | OXYGEN SATURATION: 96 % | WEIGHT: 237 LBS

## 2025-07-17 DIAGNOSIS — R50.9 FEVER, UNSPECIFIED FEVER CAUSE: ICD-10-CM

## 2025-07-17 DIAGNOSIS — F17.219 CIGARETTE NICOTINE DEPENDENCE WITH NICOTINE-INDUCED DISORDER: Chronic | ICD-10-CM

## 2025-07-17 DIAGNOSIS — J01.10 ACUTE NON-RECURRENT FRONTAL SINUSITIS: Primary | ICD-10-CM

## 2025-07-17 DIAGNOSIS — R05.1 ACUTE COUGH: ICD-10-CM

## 2025-07-17 DIAGNOSIS — L30.9 DERMATITIS: Chronic | ICD-10-CM

## 2025-07-17 PROCEDURE — 99214 OFFICE O/P EST MOD 30 MIN: CPT

## 2025-07-17 RX ORDER — MULTIVIT-MIN/IRON/FOLIC ACID/K 18-600-40
CAPSULE ORAL
COMMUNITY

## 2025-07-17 RX ORDER — VARENICLINE TARTRATE 1 MG/1
1 TABLET, FILM COATED ORAL 2 TIMES DAILY
Qty: 56 TABLET | Refills: 1 | Status: SHIPPED | OUTPATIENT
Start: 2025-08-14 | End: 2025-10-09

## 2025-07-17 RX ORDER — VARENICLINE TARTRATE 0.5 (11)-1
KIT ORAL
Qty: 1 EACH | Refills: 0 | Status: SHIPPED | OUTPATIENT
Start: 2025-07-17 | End: 2025-08-14

## 2025-07-17 RX ORDER — KETOCONAZOLE 20 MG/G
CREAM TOPICAL DAILY
Qty: 30 G | Refills: 1 | Status: SHIPPED | OUTPATIENT
Start: 2025-07-17

## 2025-07-17 RX ORDER — CHOLECALCIFEROL (VITAMIN D3) 25 MCG
1000 TABLET ORAL DAILY
COMMUNITY

## 2025-07-17 RX ORDER — TERBINAFINE HYDROCHLORIDE 250 MG/1
250 TABLET ORAL DAILY
COMMUNITY

## 2025-07-17 RX ORDER — AZITHROMYCIN 250 MG/1
TABLET, FILM COATED ORAL
Qty: 6 TABLET | Refills: 0 | Status: SHIPPED | OUTPATIENT
Start: 2025-07-17

## 2025-07-17 NOTE — PROGRESS NOTES
Chief Complaint  Cough, head congestion, Fever, and Fatigue     Subjective:      History of Present Illness {CC  Problem List  Visit  Diagnosis   Encounters  Notes  Medications  Labs  Result Review Imaging  Media :23}     Carola Joiner presents to [facility] for:      History of Present Illness        The patient presents for evaluation of COVID-19 infection, smoking cessation, and medication management.    She tested positive for COVID-19 on 07/03/2025, which she believes was contracted from her daughter. Despite completing a course of Paxlovid, she continues to feel unwell. Her symptoms include congestion that has progressed to a cough, low-grade fevers at night, intense sweating, and chills. She reports no sore throat or ear pain but mentions a sensation of tightness. She has been managing her symptoms with Mucinex D during the day and NyQuil at night for the past two days. She completed a course of Paxlovid on 07/08/2025.    She is considering another round of anti-smoking treatment as she has resumed smoking due to recent stressors. She reports that she is getting close to being successful in quitting smoking.    She is requesting a refill of her ketoconazole cream.    She is also interested in receiving the MMR vaccine.    Social History:  Tobacco: She smokes cigarettes.         I have reviewed patient's medical history, any new submitted information provided by patient or medical assistant and updated medical record.      Objective:      Physical Exam  Vitals reviewed.   Constitutional:       Appearance: Normal appearance. She is ill-appearing.   HENT:      Head: Normocephalic.      Nose: Congestion present.      Right Sinus: Maxillary sinus tenderness present. No frontal sinus tenderness.      Left Sinus: Maxillary sinus tenderness present. No frontal sinus tenderness.      Mouth/Throat:      Mouth: Mucous membranes are moist.      Pharynx: Posterior  oropharyngeal erythema present.   Cardiovascular:      Rate and Rhythm: Normal rate and regular rhythm.      Pulses: Normal pulses.      Heart sounds: Normal heart sounds.   Pulmonary:      Effort: Pulmonary effort is normal.      Breath sounds: Normal breath sounds. No wheezing or rhonchi.   Skin:     General: Skin is warm and dry.      Capillary Refill: Capillary refill takes less than 2 seconds.   Neurological:      General: No focal deficit present.      Mental Status: She is alert.   Psychiatric:         Mood and Affect: Mood normal.         Behavior: Behavior normal.        Result Review  Data Reviewed:{ Labs  Result Review  Imaging  Med Tab  Media :23}     Results  Labs   - COVID-19 Test: Positive                  Vital Signs:   /90 (BP Location: Left arm, Patient Position: Sitting, Cuff Size: Large Adult)   Pulse 109   Wt 108 kg (237 lb)   SpO2 96%   BMI 39.44 kg/m²                 Requested Prescriptions      No prescriptions requested or ordered in this encounter       Routine medications provided by this office will also be refilled via pharmacy request.       Current Outpatient Medications:     Ascorbic Acid (Vitamin C) 500 MG capsule, Take  by mouth., Disp: , Rfl:     buPROPion XL (Wellbutrin XL) 300 MG 24 hr tablet, Take 1 tablet by mouth Daily., Disp: 90 tablet, Rfl: 3    Cholecalciferol 25 MCG (1000 UT) tablet, Take 1 tablet by mouth Daily., Disp: , Rfl:     hydrOXYzine (ATARAX) 25 MG tablet, TAKE ONE TABLET BY MOUTH THREE TIMES A DAY AS NEEDED FOR ANXIETY, Disp: 90 tablet, Rfl: 1    ketoconazole (NIZORAL) 2 % cream, Apply  topically to the appropriate area as directed Daily., Disp: 30 g, Rfl: 1    Loratadine (CLARITIN PO), Take  by mouth., Disp: , Rfl:     methylPREDNISolone (MEDROL) 4 MG dose pack, Take as directed on package instructions., Disp: 21 tablet, Rfl: 0    Probiotic Product (PROBIOTIC ADVANCED PO), Take  by mouth., Disp: , Rfl:     promethazine-dextromethorphan  (PROMETHAZINE-DM) 6.25-15 MG/5ML syrup, Take 5 mL by mouth 4 (Four) Times a Day As Needed for Cough., Disp: 118 mL, Rfl: 0    terbinafine (lamiSIL) 250 MG tablet, Take 1 tablet by mouth Daily., Disp: , Rfl:     azithromycin (Zithromax Z-Justin) 250 MG tablet, Take 2 tablets by mouth on day 1, then 1 tablet daily on days 2-5, Disp: 6 tablet, Rfl: 0     Assessment and Plan:      Assessment and Plan {CC Problem List  Visit Diagnosis  ROS  Review (Popup)  Health Maintenance  Quality  BestPractice  Medications  SmartSets  SnapShot Encounters  Media :23}     Problem List Items Addressed This Visit    None         1. COVID-19 infection.  - Symptoms suggest a rebound effect from Paxlovid treatment.  - Absence of significant crackles or wheezing; mild upper respiratory discomfort noted.  - Persistent fevers and upper respiratory discomfort discussed; antibiotic regimen recommended.  - Prescription for azithromycin provided; advised to take Sudafed 12-hour or 4-hour for congestion and plain Mucinex instead of Mucinex DM; avoid contact with mother until fever-free for 24 hours.    2. Smoking cessation.  - Encouraged to continue efforts towards smoking cessation, especially given current cough.    3. Medication management.  - Refill for ketoconazole cream sent to pharmacy.    4. Health maintenance.  - Advised to get MMR vaccine at pharmacy or [facility]; option to check immunity levels through lab work before getting the vaccine discussed.     Patient verbalizes understanding and is comfortable with the plan of care.      Follow Up {Instructions Charge Capture  Follow-up Communications :23}     No follow-ups on file.      Patient was given instructions and counseling regarding her condition or for health maintenance advice. Please see specific information pulled into the AVS if appropriate.    Dragon disclaimer:   Much of this encounter note is an electronic transcription/translation of spoken language to printed  text. The electronic translation of spoken language may permit erroneous, or at times, nonsensical words or phrases to be inadvertently transcribed; Although I have reviewed the note for such errors, some may still exist.         Additional Patient Counseling:       There are no Patient Instructions on file for this visit.    Patient or patient representative verbalized consent for the use of Ambient Listening during the visit with  RE Shearer for chart documentation. 7/17/2025  10:23 EDT